# Patient Record
Sex: MALE | Race: WHITE | NOT HISPANIC OR LATINO | Employment: FULL TIME | URBAN - METROPOLITAN AREA
[De-identification: names, ages, dates, MRNs, and addresses within clinical notes are randomized per-mention and may not be internally consistent; named-entity substitution may affect disease eponyms.]

---

## 2023-03-30 DIAGNOSIS — L30.9 DERMATITIS: ICD-10-CM

## 2023-03-30 DIAGNOSIS — L30.9 DERMATITIS: Primary | ICD-10-CM

## 2023-03-30 RX ORDER — KETOCONAZOLE 20 MG/G
CREAM TOPICAL
Qty: 30 G | Refills: 1 | Status: SHIPPED | OUTPATIENT
Start: 2023-03-30 | End: 2023-03-31

## 2023-03-31 RX ORDER — KETOCONAZOLE 20 MG/G
CREAM TOPICAL
Qty: 30 G | Refills: 1 | Status: SHIPPED | OUTPATIENT
Start: 2023-03-31 | End: 2023-03-31

## 2023-06-05 LAB
ALANINE AMINOTRANSFERASE (SGPT) (U/L) IN SER/PLAS: 23 U/L (ref 10–52)
ALBUMIN (G/DL) IN SER/PLAS: 4 G/DL (ref 3.4–5)
ALKALINE PHOSPHATASE (U/L) IN SER/PLAS: 71 U/L (ref 33–120)
ANION GAP IN SER/PLAS: 9 MMOL/L (ref 10–20)
ASPARTATE AMINOTRANSFERASE (SGOT) (U/L) IN SER/PLAS: 20 U/L (ref 9–39)
BILIRUBIN TOTAL (MG/DL) IN SER/PLAS: 0.6 MG/DL (ref 0–1.2)
CALCIUM (MG/DL) IN SER/PLAS: 8.8 MG/DL (ref 8.6–10.3)
CARBON DIOXIDE, TOTAL (MMOL/L) IN SER/PLAS: 28 MMOL/L (ref 21–32)
CHLORIDE (MMOL/L) IN SER/PLAS: 103 MMOL/L (ref 98–107)
CHOLESTEROL (MG/DL) IN SER/PLAS: 123 MG/DL (ref 0–199)
CHOLESTEROL IN HDL (MG/DL) IN SER/PLAS: 45.3 MG/DL
CHOLESTEROL/HDL RATIO: 2.7
CREATININE (MG/DL) IN SER/PLAS: 0.88 MG/DL (ref 0.5–1.3)
ERYTHROCYTE DISTRIBUTION WIDTH (RATIO) BY AUTOMATED COUNT: 12.1 % (ref 11.5–14.5)
ERYTHROCYTE MEAN CORPUSCULAR HEMOGLOBIN CONCENTRATION (G/DL) BY AUTOMATED: 33 G/DL (ref 32–36)
ERYTHROCYTE MEAN CORPUSCULAR VOLUME (FL) BY AUTOMATED COUNT: 93 FL (ref 80–100)
ERYTHROCYTES (10*6/UL) IN BLOOD BY AUTOMATED COUNT: 4.52 X10E12/L (ref 4.5–5.9)
GFR MALE: >90 ML/MIN/1.73M2
GLUCOSE (MG/DL) IN SER/PLAS: 170 MG/DL (ref 74–99)
HEMATOCRIT (%) IN BLOOD BY AUTOMATED COUNT: 42.1 % (ref 41–52)
HEMOGLOBIN (G/DL) IN BLOOD: 13.9 G/DL (ref 13.5–17.5)
LDL: 63 MG/DL (ref 0–99)
LEUKOCYTES (10*3/UL) IN BLOOD BY AUTOMATED COUNT: 6 X10E9/L (ref 4.4–11.3)
PLATELETS (10*3/UL) IN BLOOD AUTOMATED COUNT: 223 X10E9/L (ref 150–450)
POTASSIUM (MMOL/L) IN SER/PLAS: 3.9 MMOL/L (ref 3.5–5.3)
PROTEIN TOTAL: 6 G/DL (ref 6.4–8.2)
SODIUM (MMOL/L) IN SER/PLAS: 136 MMOL/L (ref 136–145)
THYROTROPIN (MIU/L) IN SER/PLAS BY DETECTION LIMIT <= 0.05 MIU/L: 1.35 MIU/L (ref 0.44–3.98)
TRIGLYCERIDE (MG/DL) IN SER/PLAS: 76 MG/DL (ref 0–149)
UREA NITROGEN (MG/DL) IN SER/PLAS: 11 MG/DL (ref 6–23)
VLDL: 15 MG/DL (ref 0–40)

## 2023-06-06 LAB
ALBUMIN (MG/L) IN URINE: <7 MG/L
ALBUMIN/CREATININE (UG/MG) IN URINE: NORMAL UG/MG CRT (ref 0–30)
CREATININE (MG/DL) IN URINE: 43.2 MG/DL (ref 20–370)
ESTIMATED AVERAGE GLUCOSE FOR HBA1C: 126 MG/DL
HEMOGLOBIN A1C/HEMOGLOBIN TOTAL IN BLOOD: 6 %

## 2023-06-22 ENCOUNTER — OFFICE VISIT (OUTPATIENT)
Dept: PRIMARY CARE | Facility: CLINIC | Age: 58
End: 2023-06-22
Payer: COMMERCIAL

## 2023-06-22 VITALS
DIASTOLIC BLOOD PRESSURE: 70 MMHG | SYSTOLIC BLOOD PRESSURE: 120 MMHG | WEIGHT: 280 LBS | HEART RATE: 54 BPM | BODY MASS INDEX: 34.08 KG/M2

## 2023-06-22 DIAGNOSIS — Z12.11 SCREENING FOR MALIGNANT NEOPLASM OF COLON: ICD-10-CM

## 2023-06-22 DIAGNOSIS — E10.9 TYPE 1 DIABETES MELLITUS WITHOUT COMPLICATION (MULTI): ICD-10-CM

## 2023-06-22 DIAGNOSIS — Z96.41 INSULIN PUMP STATUS: ICD-10-CM

## 2023-06-22 DIAGNOSIS — E66.1 CLASS 1 DRUG-INDUCED OBESITY WITH SERIOUS COMORBIDITY AND BODY MASS INDEX (BMI) OF 34.0 TO 34.9 IN ADULT: ICD-10-CM

## 2023-06-22 DIAGNOSIS — Z00.00 ANNUAL PHYSICAL EXAM: Primary | ICD-10-CM

## 2023-06-22 DIAGNOSIS — E78.2 MIXED HYPERLIPIDEMIA: ICD-10-CM

## 2023-06-22 PROBLEM — L20.9 ATOPIC DERMATITIS: Status: ACTIVE | Noted: 2023-06-22

## 2023-06-22 PROBLEM — D36.9 TUBULAR ADENOMA: Status: ACTIVE | Noted: 2023-06-22

## 2023-06-22 PROBLEM — E78.5 HYPERLIPIDEMIA: Status: ACTIVE | Noted: 2023-06-22

## 2023-06-22 PROBLEM — B37.2 INTERTRIGINOUS CANDIDIASIS: Status: ACTIVE | Noted: 2023-06-22

## 2023-06-22 PROBLEM — I25.10 ATHEROSCLEROSIS OF CORONARY ARTERY: Status: ACTIVE | Noted: 2023-06-22

## 2023-06-22 PROBLEM — K63.5 BENIGN COLON POLYP: Status: ACTIVE | Noted: 2023-06-22

## 2023-06-22 PROBLEM — N52.9 ERECTILE DYSFUNCTION: Status: ACTIVE | Noted: 2023-06-22

## 2023-06-22 PROCEDURE — 3008F BODY MASS INDEX DOCD: CPT | Performed by: NURSE PRACTITIONER

## 2023-06-22 PROCEDURE — 3074F SYST BP LT 130 MM HG: CPT | Performed by: NURSE PRACTITIONER

## 2023-06-22 PROCEDURE — 99396 PREV VISIT EST AGE 40-64: CPT | Performed by: NURSE PRACTITIONER

## 2023-06-22 PROCEDURE — 1036F TOBACCO NON-USER: CPT | Performed by: NURSE PRACTITIONER

## 2023-06-22 PROCEDURE — 3044F HG A1C LEVEL LT 7.0%: CPT | Performed by: NURSE PRACTITIONER

## 2023-06-22 PROCEDURE — 3078F DIAST BP <80 MM HG: CPT | Performed by: NURSE PRACTITIONER

## 2023-06-22 PROCEDURE — 99213 OFFICE O/P EST LOW 20 MIN: CPT | Performed by: NURSE PRACTITIONER

## 2023-06-22 RX ORDER — INSULIN ASPART 100 [IU]/ML
INJECTION, SOLUTION INTRAVENOUS; SUBCUTANEOUS
COMMUNITY
End: 2023-12-18 | Stop reason: SDUPTHER

## 2023-06-22 RX ORDER — ASPIRIN 325 MG
TABLET, DELAYED RELEASE (ENTERIC COATED) ORAL
COMMUNITY
End: 2023-12-18 | Stop reason: WASHOUT

## 2023-06-22 RX ORDER — ATORVASTATIN CALCIUM 20 MG/1
1 TABLET, FILM COATED ORAL DAILY
COMMUNITY
Start: 2022-11-08 | End: 2023-12-18 | Stop reason: WASHOUT

## 2023-06-22 RX ORDER — MULTIVITAMIN
1 TABLET ORAL DAILY
COMMUNITY

## 2023-06-22 RX ORDER — LANCETS
EACH MISCELLANEOUS
COMMUNITY
Start: 2022-07-18 | End: 2024-04-09 | Stop reason: ENTERED-IN-ERROR

## 2023-06-22 RX ORDER — SILDENAFIL 100 MG/1
1 TABLET, FILM COATED ORAL DAILY PRN
COMMUNITY
Start: 2021-02-18 | End: 2023-09-22 | Stop reason: SDUPTHER

## 2023-06-22 RX ORDER — BLOOD-GLUCOSE TRANSMITTER
EACH MISCELLANEOUS
COMMUNITY
Start: 2023-04-06

## 2023-06-22 RX ORDER — ASCORBIC ACID 250 MG
250 TABLET ORAL DAILY
COMMUNITY

## 2023-06-22 NOTE — PROGRESS NOTES
Subjective   Reason for Visit: Emiliano Walker is an 57 y.o. male here for a CPE     Chief Complaint   Patient presents with    Annual Exam     Lobito is here today for yearly CPE.        HPI  58 yo male presents for CPE    Pt had FBW done earlier this month    Pt will be seeing podiatry next month, Dr. Flores      PMH: IDDM, ED, OBESITY      #IDDM  Tx per Dr. Bates  Last A1C= 6%  June 2023  Insulin Pump since 2006  not exercising regularly   Statin: Atorvastatin   ACE/ARB: none   Last eye exam:  Dec 2022     #ED  Rx Viagra as needed   no use of nitrates    #OBESITY, BMI 34  2/2 Insulin Pump  Started exercising regularly last week w/wife      #HM      COLON: DUE 2023  PSA: UTD 2021       Orders Only on 06/05/2023   Component Date Value Ref Range Status    WBC 06/05/2023 6.0  4.4 - 11.3 x10E9/L Final    RBC 06/05/2023 4.52  4.50 - 5.90 x10E12/L Final    Hemoglobin 06/05/2023 13.9  13.5 - 17.5 g/dL Final    Hematocrit 06/05/2023 42.1  41.0 - 52.0 % Final    MCV 06/05/2023 93  80 - 100 fL Final    MCHC 06/05/2023 33.0  32.0 - 36.0 g/dL Final    Platelets 06/05/2023 223  150 - 450 x10E9/L Final    RDW 06/05/2023 12.1  11.5 - 14.5 % Final    Glucose 06/05/2023 170 (H)  74 - 99 mg/dL Final    Sodium 06/05/2023 136  136 - 145 mmol/L Final    Potassium 06/05/2023 3.9  3.5 - 5.3 mmol/L Final    Chloride 06/05/2023 103  98 - 107 mmol/L Final    Bicarbonate 06/05/2023 28  21 - 32 mmol/L Final    Anion Gap 06/05/2023 9 (L)  10 - 20 mmol/L Final    Urea Nitrogen 06/05/2023 11  6 - 23 mg/dL Final    Creatinine 06/05/2023 0.88  0.50 - 1.30 mg/dL Final    GFR MALE 06/05/2023 >90  >90 mL/min/1.73m2 Final    Comment:  CALCULATIONS OF ESTIMATED GFR ARE PERFORMED   USING THE 2021 CKD-EPI STUDY REFIT EQUATION   WITHOUT THE RACE VARIABLE FOR THE IDMS-TRACEABLE   CREATININE METHODS.    https://jasn.asnjournals.org/content/early/2021/09/22/ASN.3783589596      Calcium 06/05/2023 8.8  8.6 - 10.3 mg/dL Final    Albumin 06/05/2023 4.0   3.4 - 5.0 g/dL Final    Alkaline Phosphatase 06/05/2023 71  33 - 120 U/L Final    Total Protein 06/05/2023 6.0 (L)  6.4 - 8.2 g/dL Final    AST 06/05/2023 20  9 - 39 U/L Final    Total Bilirubin 06/05/2023 0.6  0.0 - 1.2 mg/dL Final    ALT (SGPT) 06/05/2023 23  10 - 52 U/L Final    Comment:  Patients treated with Sulfasalazine may generate    falsely decreased results for ALT.      Hemoglobin A1C 06/05/2023 6.0 (A)  % Final    Comment:      Diagnosis of Diabetes-Adults   Non-Diabetic: < or = 5.6%   Increased risk for developing diabetes: 5.7-6.4%   Diagnostic of diabetes: > or = 6.5%  .       Monitoring of Diabetes                Age (y)     Therapeutic Goal (%)   Adults:          >18           <7.0   Pediatrics:    13-18           <7.5                   7-12           <8.0                   0- 6            7.5-8.5   American Diabetes Association. Diabetes Care 33(S1), Jan 2010.      Estimated Average Glucose 06/05/2023 126  MG/DL Final    Cholesterol 06/05/2023 123  0 - 199 mg/dL Final    Comment: .      AGE      DESIRABLE   BORDERLINE HIGH   HIGH     0-19 Y     0 - 169       170 - 199     >/= 200    20-24 Y     0 - 189       190 - 224     >/= 225         >24 Y     0 - 199       200 - 239     >/= 240   **All ranges are based on fasting samples. Specific   therapeutic targets will vary based on patient-specific   cardiac risk.  .   Pediatric guidelines reference:Pediatrics 2011, 128(S5).   Adult guidelines reference: NCEP ATPIII Guidelines,     NATASHA 2001, 258:2486-97  .   Venipuncture immediately after or during the    administration of Metamizole may lead to falsely   low results. Testing should be performed immediately   prior to Metamizole dosing.      HDL 06/05/2023 45.3  mg/dL Final    Comment: .      AGE      VERY LOW   LOW     NORMAL    HIGH       0-19 Y       < 35   < 40     40-45     ----    20-24 Y       ----   < 40       >45     ----      >24 Y       ----   < 40     40-60      >60  .      Cholesterol/HDL  Ratio 06/05/2023 2.7   Final    Comment: REF VALUES  DESIRABLE  < 3.4  HIGH RISK  > 5.0      LDL 06/05/2023 63  0 - 99 mg/dL Final    Comment: .                           NEAR      BORD      AGE      DESIRABLE  OPTIMAL    HIGH     HIGH     VERY HIGH     0-19 Y     0 - 109     ---    110-129   >/= 130     ----    20-24 Y     0 - 119     ---    120-159   >/= 160     ----      >24 Y     0 -  99   100-129  130-159   160-189     >/=190  .      VLDL 06/05/2023 15  0 - 40 mg/dL Final    Triglycerides 06/05/2023 76  0 - 149 mg/dL Final    Comment: .      AGE      DESIRABLE   BORDERLINE HIGH   HIGH     VERY HIGH   0 D-90 D    19 - 174         ----         ----        ----  91 D- 9 Y     0 -  74        75 -  99     >/= 100      ----    10-19 Y     0 -  89        90 - 129     >/= 130      ----    20-24 Y     0 - 114       115 - 149     >/= 150      ----         >24 Y     0 - 149       150 - 199    200- 499    >/= 500  .   Venipuncture immediately after or during the    administration of Metamizole may lead to falsely   low results. Testing should be performed immediately   prior to Metamizole dosing.      ALBUMIN (MG/L) IN URINE 06/05/2023 <7.0  Not Established mg/L Final    Albumin/Creatine Ratio 06/05/2023 SEE COMMENT  0.0 - 30.0 ug/mg crt Final    Comment: One or more analytes used in this calculation   is outside of the analytical measurement range.  Calculation cannot be performed.      Creatinine, Urine 06/05/2023 43.2  20.0 - 370.0 mg/dL Final    TSH 06/05/2023 1.35  0.44 - 3.98 mIU/L Final    Comment:  TSH testing is performed using different testing    methodology at [facility] than at other    [facility]. Direct result comparisons should    only be made within the same method.           Patient Care Team:  VIRY Barksdale as PCP - General (Family Medicine)  VIRY Barksdale as PCP - Employee [employer] PCP     Review of Systems  All 13 systems were reviewed and are within normal limits  except positive and pertinent negative responses which are noted below or in HPI.      Objective   Vitals:  /70   Pulse 54   Wt 127 kg (280 lb)   BMI 34.08 kg/m²       Physical Exam    Assessment/Plan

## 2023-06-22 NOTE — PATIENT INSTRUCTIONS
Thank you for seeing me today.  It was a pleasure to see you again!    Today we did your Annual Physical Exam and discussed the following:     Continue all medications     Schedule Colonoscopy    See Podiatry as scheduled    RTC 1 YEAR AND AS NEEDED

## 2023-09-22 DIAGNOSIS — N52.9 ERECTILE DYSFUNCTION, UNSPECIFIED ERECTILE DYSFUNCTION TYPE: Primary | ICD-10-CM

## 2023-09-22 RX ORDER — SILDENAFIL 100 MG/1
100 TABLET, FILM COATED ORAL DAILY PRN
Qty: 10 TABLET | Refills: 3 | Status: SHIPPED | OUTPATIENT
Start: 2023-09-22 | End: 2023-09-22

## 2023-10-30 ENCOUNTER — PHARMACY VISIT (OUTPATIENT)
Dept: PHARMACY | Facility: CLINIC | Age: 58
End: 2023-10-30
Payer: COMMERCIAL

## 2023-10-30 DIAGNOSIS — E10.9 TYPE 1 DIABETES MELLITUS WITHOUT COMPLICATION (MULTI): Primary | ICD-10-CM

## 2023-10-30 PROCEDURE — RXMED WILLOW AMBULATORY MEDICATION CHARGE

## 2023-11-02 RX ORDER — INSULIN ASPART 100 [IU]/ML
INJECTION, SOLUTION INTRAVENOUS; SUBCUTANEOUS
Qty: 110 ML | Refills: 3 | Status: SHIPPED | OUTPATIENT
Start: 2023-11-02 | End: 2023-12-18 | Stop reason: CLARIF

## 2023-11-03 ENCOUNTER — PHARMACY VISIT (OUTPATIENT)
Dept: PHARMACY | Facility: CLINIC | Age: 58
End: 2023-11-03
Payer: COMMERCIAL

## 2023-11-03 PROCEDURE — RXMED WILLOW AMBULATORY MEDICATION CHARGE

## 2023-12-18 ENCOUNTER — OFFICE VISIT (OUTPATIENT)
Dept: ENDOCRINOLOGY | Facility: CLINIC | Age: 58
End: 2023-12-18
Payer: COMMERCIAL

## 2023-12-18 VITALS
SYSTOLIC BLOOD PRESSURE: 133 MMHG | HEART RATE: 60 BPM | BODY MASS INDEX: 34.57 KG/M2 | DIASTOLIC BLOOD PRESSURE: 79 MMHG | WEIGHT: 284 LBS

## 2023-12-18 DIAGNOSIS — E10.9 TYPE 1 DIABETES MELLITUS WITHOUT COMPLICATION (MULTI): Primary | ICD-10-CM

## 2023-12-18 DIAGNOSIS — Z96.41 INSULIN PUMP STATUS: ICD-10-CM

## 2023-12-18 LAB — POC HEMOGLOBIN A1C: 6.8 % (ref 4.2–6.5)

## 2023-12-18 PROCEDURE — 1036F TOBACCO NON-USER: CPT | Performed by: INTERNAL MEDICINE

## 2023-12-18 PROCEDURE — 99214 OFFICE O/P EST MOD 30 MIN: CPT | Performed by: INTERNAL MEDICINE

## 2023-12-18 PROCEDURE — 3078F DIAST BP <80 MM HG: CPT | Performed by: INTERNAL MEDICINE

## 2023-12-18 PROCEDURE — 3075F SYST BP GE 130 - 139MM HG: CPT | Performed by: INTERNAL MEDICINE

## 2023-12-18 PROCEDURE — 83036 HEMOGLOBIN GLYCOSYLATED A1C: CPT | Performed by: INTERNAL MEDICINE

## 2023-12-18 PROCEDURE — 3008F BODY MASS INDEX DOCD: CPT | Performed by: INTERNAL MEDICINE

## 2023-12-18 PROCEDURE — 3044F HG A1C LEVEL LT 7.0%: CPT | Performed by: INTERNAL MEDICINE

## 2023-12-18 RX ORDER — INSULIN ASPART 100 [IU]/ML
INJECTION, SOLUTION INTRAVENOUS; SUBCUTANEOUS
Qty: 120 ML | Refills: 3 | Status: SHIPPED | OUTPATIENT
Start: 2023-12-18 | End: 2024-01-26 | Stop reason: CLARIF

## 2023-12-18 ASSESSMENT — ENCOUNTER SYMPTOMS
UNEXPECTED WEIGHT CHANGE: 0
NAUSEA: 0
COUGH: 0
VOMITING: 0
DIARRHEA: 0
ENDOCRINE COMMENTS: AS ABOVE

## 2023-12-18 NOTE — PATIENT INSTRUCTIONS
A1c 6.8%    RECOMMENDATIONS  Continue current program  Change from Novolog to Humalog per formulary    Follow up 6 months  Labs and urine albumin before next appointment

## 2023-12-18 NOTE — PROGRESS NOTES
"History Of Present Illness  Emiliano Walker \"Bryn" is a 58 y.o. 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), Benign neoplasm, unspecified site (10/10/2017), Disorder of the skin and subcutaneous tissue, unspecified (10/05/2015), Hyperlipidemia, unspecified (06/21/2022), Other conditions influencing health status (10/10/2017), Other hypertrophic disorders of the skin (10/05/2015), Otitis media, unspecified, right ear (03/12/2021), Pain in left foot (10/29/2018), Pain in left leg (07/13/2019), Pain in unspecified wrist (12/26/2020), Personal history of diseases of the skin and subcutaneous tissue (08/26/2013), Personal history of other diseases of the circulatory system (12/29/2015), Personal history of other diseases of the respiratory system (07/13/2019), Personal history of other specified conditions (10/10/2017), Polyp of colon (10/10/2017), Superficial mycosis, unspecified (06/03/2020), Type 1 diabetes mellitus without complications (CMS/HCC) (12/19/2022), Unspecified acute noninfective otitis externa, right ear (04/02/2021), Unspecified injury of right wrist, hand and finger(s), initial encounter (12/28/2020), Unspecified injury of unspecified wrist, hand and finger(s), initial encounter, and Unspecified injury of unspecified wrist, hand and finger(s), initial encounter.    Surgical History  He has a past surgical history that includes " Urology Clinic Note - New Patient    Referring Provider: Vera Guajardo, [address] [facility] [address]  [address] [place] [address]     Primary Care Provider: Vera Guajardo DO     Chief Complaint:   LUTS, elevates PSA     HPI:     Ric Swanson is a 67year old male with history of HTN, HLD,  AAA, obesity, GERD, Afib on coumadin referred for LUTs, elevated PSA. Has history of colonic diverticular abscess; he presented to hospital with acute diverticulitis with abscess; he was taken urgently to the operating room 1/17/219 for ex lap with drainage of abscess and sigmoid resection +colostomy. At that time urology was asked to see patient due to bladder wall thickening on CT scan and possible involvement with the bladder- they did take bladder biopsies intra-op from exterior bladder where the abscess was to evlauate this further; (biopsy with just inflammation and fibrosis) cystoscopy was done intra-op with Dr. David Rivero which showed edematous changes at bladder dome and posterior wall but no primarily malignancy. Was thought to be all related to his abscess process. Had urinary retention after this with hematuria. Had outpatient cystoscopy with Dr. David Rivero 2/23/2019- cystitis was noted but no other abnormalities. Also with enlarged prostate. He therefore underwent a PVP in [id number] (total joules 96217). Follow up PVR was 0 . At that time he did have a PSA of >4 but but wanted observation. He has not since followed with urology. Of note, last imaging about 18mo ago with very distended bladder but no hydronephrosis; small diverticulum noted in bladder. Prostate mildly enlarged. Patient has no recollection of prostate PVP or retention. He has been noted to have elevated PSA in the past but has been taking OTC supplements to try to bring this down. He remains on Tamsulosin. Patient also with recently worsening LUTS; sepcifically incomplete emptying and frequency.  PVR today is >1L and patient unable to empty Other surgical history (08/26/2013); Colonoscopy (01/15/2019); and Other surgical history (06/21/2022).     Social History  He reports that he has never smoked. He has never been exposed to tobacco smoke. He has never used smokeless tobacco. He reports that he does not currently use alcohol. He reports that he does not use drugs.    Family History  Family History   Problem Relation Name Age of Onset    Other (bypass) Father      Colon cancer Maternal Grandmother         Medications  Current Outpatient Medications   Medication Instructions    Accu-Chek Fastclix Lancet Drum misc     ascorbic acid (Vitamin C) 250 mg tablet oral    atorvastatin (Lipitor) 20 mg tablet TAKE 1 TABLET BY MOUTH ONE TIME DAILY    Dexcom G6 Transmitter device     insulin aspart (NovoLOG U-100 Insulin aspart) 100 unit/mL injection USE AS DIRECTED WITH INSULIN PUMP UP  UNITS PER DAY    ketoconazole (NIZOral) 2 % cream APPLY A THIN LAYER TO AFFECTED AREA(S) OF SKIN TWICE A DAY    multivitamin (Daily Multi-Vitamin) tablet oral    NovoLOG U-100 Insulin aspart 100 unit/mL injection subcutaneous    sildenafil (Viagra) 100 mg tablet TAKE 1 TABLET BY MOUTH ONE TIME DAILY AS NEEDED       Allergies  Patient has no known allergies.    Review of Systems   Constitutional:  Negative for unexpected weight change.   Eyes:  Negative for visual disturbance.   Respiratory:  Negative for cough.    Gastrointestinal:  Negative for diarrhea, nausea and vomiting.   Endocrine:        As above         Last Recorded Vitals  Blood pressure 133/79, pulse 60, weight 129 kg (284 lb).    Physical Exam  Constitutional:       General: He is not in acute distress.  HENT:      Head: Normocephalic.   Eyes:      Extraocular Movements: Extraocular movements intact.   Neck:      Thyroid: No thyromegaly.   Cardiovascular:      Pulses:           Radial pulses are 2+ on the right side and 2+ on the left side.   Musculoskeletal:      Right lower leg: No edema.      Left lower leg:  more. AUA SS is 13. He is asymptomatic from this. No abdominal or flank pain. No UTI. Cr has been stable 1.1-1.2 over past year. No gross hematuria. Post-void residual bladder scan: 1200 mL  Urinalysis (clinic dip): Trace L     PSA:    Lab Results   Component Value Date    PSAS 5.43 (H) 09/09/2022    PSAS 4.59 (H) 09/09/2021    PSAS 4.32 (H) 08/07/2018    PSAS 3.99 06/30/2017      Last Cr was 1.24 done on 8/17/2023. History:     Past Medical History:   Diagnosis Date    Abdominal hernia     Actinic keratosis 8/17/2018    Acute diverticulitis 1/17/2019    Acute pain of right knee 6/14/2018    Aortic root dilation ([address] 3/17/2019    Arrhythmia     Arthritis     Ascending aorta dilation ([address] 3/17/2019    Bicuspid aortic valve 3/17/2019    Class 1 obesity due to excess calories with serious comorbidity and body mass index (BMI) of 32.0 to 32.9 in adult 3/17/2019    Associated with hypertension    Class 1 obesity due to excess calories with serious comorbidity and body mass index (BMI) of 34.0 to 34.9 in adult 8/1/2019    Associated with Hypertension and Hyperlipidemia    Colonic diverticular abscess     Elevated left ventricular end-diastolic pressure (LVEDP) 3/17/2019    Esophageal reflux     Gastroesophageal reflux disease 6/30/2017    Heart palpitations     Herpes zoster without complication 18/3/0738    11/29/2017    High blood pressure     History of cardiac murmur     History of rheumatic fever     Mild aortic valve regurgitation 3/17/2019    Mixed hyperlipidemia 6/30/2017    Perforation of sigmoid colon due to diverticulitis 2/7/2019    Postherpetic neuralgia 12/12/2017    Right trigeminal neuralgia 12/12/2017    Seborrheic keratosis 8/17/2018       Past Surgical History:   Procedure Laterality Date    COLECTOMY  01/17/2019    Sigmoid colon resection with colostomy creation    COLONOSCOPY  06/21/2019    Dr Susy Zepeda    EGD  02/22/2022    With biopsy.   Dr. Jose Tierney      x2 right No edema.   Lymphadenopathy:      Cervical: No cervical adenopathy.   Neurological:      Mental Status: He is alert.      Motor: No tremor.   Psychiatric:         Mood and Affect: Affect normal.          Relevant Results  Glucose (mg/dL)   Date Value   06/05/2023 170 (H)   08/18/2022 156 (H)   02/05/2022 93     Hemoglobin A1C (%)   Date Value   06/05/2023 6.0 (A)   08/18/2022 6.4 (A)   02/05/2022 6.5 (A)     Bicarbonate (mmol/L)   Date Value   06/05/2023 28   08/18/2022 25   02/05/2022 28     Urea Nitrogen (mg/dL)   Date Value   06/05/2023 11   08/18/2022 10   02/05/2022 8     Creatinine (mg/dL)   Date Value   06/05/2023 0.88   08/18/2022 0.78   02/05/2022 0.77     Lab Results   Component Value Date    CHOL 123 06/05/2023    CHOL 127 08/18/2022    CHOL 124 02/05/2022     Lab Results   Component Value Date    HDL 45.3 06/05/2023    HDL 45.6 08/18/2022    HDL 54.0 02/05/2022     Lab Results   Component Value Date    TRIG 76 06/05/2023    TRIG 60 08/18/2022    TRIG 40 02/05/2022     Lab Results   Component Value Date    TSH 1.35 06/05/2023       IMPRESSION  TYPE 1 DIABETES MELLITUS  INSULIN PUMP STATUS  Rapid A1c 6.8%  Excellent overall glucose control      RECOMMENDATIONS  Continue current program  Change from Novolog to Humalog per formulary    Follow up 6 months  Labs and urine albumin before next appointment     side; many years ago    Alfredo 2484 lens removal and replaced @ [facility]     OTHER SURGICAL HISTORY  1/1719    Exploratory laparotomy. OTHER SURGICAL HISTORY  01/17/2019    Drainage of intra-abdominal abscess       Family History   Problem Relation Age of Onset    Melanoma Father     Heart Disease Father     Stroke Mother        Social History     Socioeconomic History    Marital status:    Tobacco Use    Smoking status: Never     Passive exposure: Past    Smokeless tobacco: Never   Vaping Use    Vaping Use: Never used   Substance and Sexual Activity    Alcohol use: Yes     Alcohol/week: 1.0 standard drink of alcohol     Types: 1 Glasses of wine per week     Comment: occ    Drug use: No   Other Topics Concern    Caffeine Concern Yes     Comment: 1 bottle of diet soda weekly    Exercise Yes     Comment: Walking the dogs daily    Seat Belt Yes       Medications (Active prior to today's visit):  Current Outpatient Medications   Medication Sig Dispense Refill    lisinopril-hydroCHLOROthiazide 10-12.5 MG Oral Tab Take 1 tablet by mouth daily. 90 tablet 0    pantoprazole 20 MG Oral Tab EC Take 1 tablet (20 mg total) by mouth every morning before breakfast. 90 tablet 0    ATORVASTATIN 40 MG Oral Tab TAKE 1 TABLET(40 MG) BY MOUTH EVERY NIGHT 90 tablet 3    Multiple Vitamins-Minerals (HAIR/SKIN/NAILS) Oral Tab Multiple Vitamins-Minerals (HAIR/SKIN/NAILS) Oral Tab, [RxNorm: 0]      metoprolol succinate ER 25 MG Oral Tablet 24 Hr Take 0.5 tablets (12.5 mg total) by mouth 2 (two) times daily. Prescribed by Dr. Steve Turk 1 tablet 0    aspirin 81 MG Oral Chew Tab Chew 1 tablet (81 mg total) by mouth daily. Cholecalciferol (VITAMIN D) 50 MCG (2000 UT) Oral Tab Take 2,000 Units by mouth daily with dinner. Thierno, Zingiber officinalis, (THIERNO OR) Take by mouth daily. TURMERIC OR Take by mouth daily. warfarin 2.5 MG Oral Tab Take 1 tablet (2.5 mg total) by mouth daily. Flecainide 100 MG Oral Tab Take 1 tablet (100 mg total) by mouth every 12 (twelve) hours. 60 tablet 3    acetaminophen 325 MG Oral Tab Take 1 tablet (325 mg total) by mouth every 6 (six) hours as needed for Pain. AQUAPHOR External Ointment Apply topically as needed for Dry Skin. Misc Natural Products (PROSTATE SUPPORT OR) Take 1 tablet by mouth daily. Allergies:    Amlodipine              OTHER (SEE COMMENTS)    Comment:Chest Pain      Review of Systems:   A comprehensive 10-point review of systems was completed. Pertinent positives and negatives are noted in the the HPI. Physical Exam:   CONSTITUTIONAL: Well developed, well nourished, in no acute distress  NEUROLOGIC: Alert and oriented  HEAD: Normocephalic, atraumatic  ENT: Hearing intact   RESPIRATORY: Normal respiratory effort  SKIN: No evident rashes  ABDOMEN: Soft, non-tender, non-distended, colostomy in place with stool   SABINA deferred per patient     No results found. Assessment & Plan:       Placido Dubon is a 67year old male with history of HTN, HLD,  AAA, obesity, GERD, Afib on coumadin referred for LUTs, elevated PSA. # Urinary retention  - Patient with complicated history as noted above; had an ex lap for sigmoid abscess that involved wall of bladder in 2019 with postop retention and subsequent PVP with Dr. Cas Sam. Apparently was voiding well after this but CT in 2021 with massively dilated bladder. Prostate at that time only mildly enlarged. PVR today >1000cc. His renal function appears to be stable; he has no history of UTI or upper tract injury on imaging. Has no abdominal or flank pain. I still recommended a ramos catheter or CIC for decompression; he declined and understands risks of kidney failure, infection, death. He would like to proceed with further workup. Will plan for BMP, CT urogram and cystoscopy to evaluate bladder and prostate further and assess upper tracts. If no obvious abnormalities may require UDS. Continue flomax. Check Ucx. # Elevated PSA;     I had a long discussion with the patient regarding his elevated PSA level. I explained that PSA (prostate-specific antigen) is a protein that is secreted by prostate cells into the blood stream.  I reviewed the various causes of PSA elevation, both benign and malignant, including prostate cancer, benign prostatic hyperplasia, prostatitis, recent urinary tract instrumentation, urinary infections, and urinary retention. In addition, a number of factors can alter the PSA level over time, including age, medications (such as finasteride), diet, herbal supplements, and of course surgical procedures on the prostate itself. With an elevated PSA level, it is important to rule out prostate cancer as a potential cause. The only way to reliably do this is with a biopsy of the prostate. I explained that this is done under ultrasound guidance using a rectal probe. This allows excellent visualization of the prostate, measurements of the size of the gland, as well as accurate placement of the biospy needle. I emphasized that, although prostate biopsies are good at detecting prostate cancer, it is not 100%. It is subject to sampling error, and there is a possibility of actually missing the area of the prostate with the cancer. I assured the patient that we try to sample the areas that are most likely going to be involved with the cancer (usually at the periphery of the gland). In cases of a repeat biopsy, we will also sample other areas, such as the transition zone or central areas of the prostate for a more complete assessment. We discussed potential for 4k score testing to further risk stratify and he would like to proceed. He is high risk for biopsy given his Coumadin use. He would lke to avoid biopsy unless found to be high risk. Return in 3 weeks for cysto and to discuss 4k score           Thank you for this consult.     I have personally reviewed all relevant medical records, labs, and imaging.             Daniella Street MD  Staff Urologist  [facility] [address], [facility]  Office: [phone]

## 2023-12-18 NOTE — LETTER
"December 18, 2023     Marina Martinez, APRN-CNP  [address]  [facility]  [facility] [id number]    Patient: Lobito Walker   YOB: 1965   Date of Visit: 12/18/2023       Dear Dr. Marina Martinez, APRN-CNP:    Thank you for referring Lobito Walker to me for evaluation. Below are my notes for this consultation.  If you have questions, please do not hesitate to call me. I look forward to following your patient along with you.       Sincerely,     Tyrell Bates MD      CC: No Recipients  ______________________________________________________________________________________    History Of Present Illness  Emiliano Walker \"Bryn" is a 58 y.o. 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), Benign neoplasm, unspecified site (10/10/2017), Disorder of the skin and subcutaneous tissue, unspecified (10/05/2015), Hyperlipidemia, unspecified (06/21/2022), Other conditions influencing health status (10/10/2017), Other hypertrophic disorders of the skin (10/05/2015), Otitis media, unspecified, right ear (03/12/2021), Pain in left foot (10/29/2018), Pain in left leg (07/13/2019), Pain in unspecified wrist (12/26/2020), Personal history of diseases of the skin and subcutaneous tissue (08/26/2013), Personal history of other diseases of the circulatory system (12/29/2015), Personal history of other diseases of the " respiratory system (07/13/2019), Personal history of other specified conditions (10/10/2017), Polyp of colon (10/10/2017), Superficial mycosis, unspecified (06/03/2020), Type 1 diabetes mellitus without complications (CMS/[facility]) (12/19/2022), Unspecified acute noninfective otitis externa, right ear (04/02/2021), Unspecified injury of right wrist, hand and finger(s), initial encounter (12/28/2020), Unspecified injury of unspecified wrist, hand and finger(s), initial encounter, and Unspecified injury of unspecified wrist, hand and finger(s), initial encounter.    Surgical History  He has a past surgical history that includes Other surgical history (08/26/2013); Colonoscopy (01/15/2019); and Other surgical history (06/21/2022).     Social History  He reports that he has never smoked. He has never been exposed to tobacco smoke. He has never used smokeless tobacco. He reports that he does not currently use alcohol. He reports that he does not use drugs.    Family History  Family History   Problem Relation Name Age of Onset   • Other (bypass) Father     • Colon cancer Maternal Grandmother         Medications  Current Outpatient Medications   Medication Instructions   • Accu-Chek Fastclix Lancet Drum misc    • ascorbic acid (Vitamin C) 250 mg tablet oral   • atorvastatin (Lipitor) 20 mg tablet TAKE 1 TABLET BY MOUTH ONE TIME DAILY   • Dexcom G6 Transmitter device    • insulin aspart (NovoLOG U-100 Insulin aspart) 100 unit/mL injection USE AS DIRECTED WITH INSULIN PUMP UP  UNITS PER DAY   • ketoconazole (NIZOral) 2 % cream APPLY A THIN LAYER TO AFFECTED AREA(S) OF SKIN TWICE A DAY   • multivitamin (Daily Multi-Vitamin) tablet oral   • NovoLOG U-100 Insulin aspart 100 unit/mL injection subcutaneous   • sildenafil (Viagra) 100 mg tablet TAKE 1 TABLET BY MOUTH ONE TIME DAILY AS NEEDED       Allergies  Patient has no known allergies.    Review of Systems   Constitutional:  Negative for unexpected weight change.   Eyes:   Negative for visual disturbance.   Respiratory:  Negative for cough.    Gastrointestinal:  Negative for diarrhea, nausea and vomiting.   Endocrine:        As above         Last Recorded Vitals  Blood pressure 133/79, pulse 60, weight 129 kg (284 lb).    Physical Exam  Constitutional:       General: He is not in acute distress.  HENT:      Head: Normocephalic.   Eyes:      Extraocular Movements: Extraocular movements intact.   Neck:      Thyroid: No thyromegaly.   Cardiovascular:      Pulses:           Radial pulses are 2+ on the right side and 2+ on the left side.   Musculoskeletal:      Right lower leg: No edema.      Left lower leg: No edema.   Lymphadenopathy:      Cervical: No cervical adenopathy.   Neurological:      Mental Status: He is alert.      Motor: No tremor.   Psychiatric:         Mood and Affect: Affect normal.          Relevant Results  Glucose (mg/dL)   Date Value   06/05/2023 170 (H)   08/18/2022 156 (H)   02/05/2022 93     Hemoglobin A1C (%)   Date Value   06/05/2023 6.0 (A)   08/18/2022 6.4 (A)   02/05/2022 6.5 (A)     Bicarbonate (mmol/L)   Date Value   06/05/2023 28   08/18/2022 25   02/05/2022 28     Urea Nitrogen (mg/dL)   Date Value   06/05/2023 11   08/18/2022 10   02/05/2022 8     Creatinine (mg/dL)   Date Value   06/05/2023 0.88   08/18/2022 0.78   02/05/2022 0.77     Lab Results   Component Value Date    CHOL 123 06/05/2023    CHOL 127 08/18/2022    CHOL 124 02/05/2022     Lab Results   Component Value Date    HDL 45.3 06/05/2023    HDL 45.6 08/18/2022    HDL 54.0 02/05/2022     Lab Results   Component Value Date    TRIG 76 06/05/2023    TRIG 60 08/18/2022    TRIG 40 02/05/2022     Lab Results   Component Value Date    TSH 1.35 06/05/2023       IMPRESSION  TYPE 1 DIABETES MELLITUS  INSULIN PUMP STATUS  Rapid A1c 6.8%  Excellent overall glucose control      RECOMMENDATIONS  Continue current program  Change from Novolog to Humalog per formulary    Follow up 6 months  Labs and urine albumin  before next appointment

## 2024-01-26 DIAGNOSIS — E10.9 TYPE 1 DIABETES MELLITUS WITHOUT COMPLICATION (MULTI): Primary | ICD-10-CM

## 2024-01-26 PROCEDURE — RXMED WILLOW AMBULATORY MEDICATION CHARGE

## 2024-01-26 RX ORDER — INSULIN LISPRO 100 [IU]/ML
INJECTION, SOLUTION INTRAVENOUS; SUBCUTANEOUS
Qty: 120 ML | Refills: 3 | Status: SHIPPED | OUTPATIENT
Start: 2024-01-26

## 2024-01-27 PROCEDURE — RXMED WILLOW AMBULATORY MEDICATION CHARGE

## 2024-01-27 RX ORDER — ATORVASTATIN CALCIUM 20 MG/1
20 TABLET, FILM COATED ORAL DAILY
Qty: 90 TABLET | Refills: 3 | Status: CANCELLED | OUTPATIENT
Start: 2024-01-27 | End: 2025-01-26

## 2024-01-29 ENCOUNTER — PHARMACY VISIT (OUTPATIENT)
Dept: PHARMACY | Facility: CLINIC | Age: 59
End: 2024-01-29
Payer: COMMERCIAL

## 2024-01-29 PROCEDURE — RXMED WILLOW AMBULATORY MEDICATION CHARGE

## 2024-02-07 DIAGNOSIS — E78.2 MIXED HYPERLIPIDEMIA: Primary | ICD-10-CM

## 2024-02-08 ENCOUNTER — PHARMACY VISIT (OUTPATIENT)
Dept: PHARMACY | Facility: CLINIC | Age: 59
End: 2024-02-08
Payer: COMMERCIAL

## 2024-02-08 PROCEDURE — RXMED WILLOW AMBULATORY MEDICATION CHARGE

## 2024-02-08 RX ORDER — ATORVASTATIN CALCIUM 20 MG/1
20 TABLET, FILM COATED ORAL DAILY
Qty: 90 TABLET | Refills: 3 | Status: SHIPPED | OUTPATIENT
Start: 2024-02-08 | End: 2025-02-07

## 2024-02-12 ENCOUNTER — TELEPHONE (OUTPATIENT)
Dept: PHARMACY | Facility: HOSPITAL | Age: 59
End: 2024-02-12
Payer: COMMERCIAL

## 2024-02-12 DIAGNOSIS — E10.9 TYPE 1 DIABETES MELLITUS WITHOUT COMPLICATION (MULTI): Primary | ICD-10-CM

## 2024-02-12 NOTE — TELEPHONE ENCOUNTER
"[facility] ([facility])    Emiliano Walker \"Bryn" is a 58 y.o. male was contacted by Clinical Pharmacy Team to complete a comprehensive medication review (CMR) with a pharmacist as part of the [employer] diabetes program.      No Known Allergies    [facility]  [address]  [place] [address]  Phone: [phone] Fax: [phone]        LAB  Lab Results   Component Value Date    BILITOT 0.6 06/05/2023    CALCIUM 8.8 06/05/2023    CO2 28 06/05/2023     06/05/2023    CREATININE 0.88 06/05/2023    GLUCOSE 170 (H) 06/05/2023    ALKPHOS 71 06/05/2023    K 3.9 06/05/2023    PROT 6.0 (L) 06/05/2023     06/05/2023    AST 20 06/05/2023    ALT 23 06/05/2023    BUN 11 06/05/2023    ANIONGAP 9 (L) 06/05/2023    ALBUMIN 4.0 06/05/2023    GFRMALE >90 06/05/2023     Lab Results   Component Value Date    TRIG 76 06/05/2023    CHOL 123 06/05/2023    HDL 45.3 06/05/2023     Lab Results   Component Value Date    HGBA1C 6.8 (A) 12/18/2023       Current Outpatient Medications on File Prior to Visit   Medication Sig Dispense Refill    Accu-Chek Fastclix Lancet Drum misc       ascorbic acid (Vitamin C) 250 mg tablet Take by mouth.      atorvastatin (Lipitor) 20 mg tablet TAKE 1 TABLET BY MOUTH ONE TIME DAILY 90 tablet 3    Dexcom G6 Transmitter device       insulin lispro (HumaLOG U-100 Insulin) 100 unit/mL injection Continuous subcutaneous insulin pump infusion, 120 units per day. 120 mL 3    ketoconazole (NIZOral) 2 % cream APPLY A THIN LAYER TO AFFECTED AREA(S) OF SKIN TWICE A DAY 30 g 1    multivitamin (Daily Multi-Vitamin) tablet Take by mouth.      sildenafil (Viagra) 100 mg tablet TAKE 1 TABLET BY MOUTH ONE TIME DAILY AS NEEDED 10 tablet 3    [DISCONTINUED] atorvastatin (Lipitor) 20 mg tablet TAKE 1 TABLET BY MOUTH ONE TIME DAILY 90 tablet 3     No current facility-administered medications on file prior to visit.        CURRENT " How Severe Is Your Skin Discoloration?: mild DIABETES PHARMACOTHERAPY  - insulin lispro continuous subcutaneous insulin pump infusion, 120 units per day  - Dexcom G6     SECONDARY PREVENTION  - Statin? yes atorvastatin  - ACE-I/ARB? no    ASSESSMENT/PLAN:  - Patient enrolled in  Employee diabetes program for $0 co-pays on diabetes medications/supplies. Enrollment should be active in 2-4 weeks   - Requested [facility] enrollment date: 2/12/2024  - PharmD Management Level: 1  -  Pharmacy fill location:  [facility]    Problem List Items Addressed This Visit       Type 1 diabetes mellitus (CMS/HCC) - Primary        Follow up: 11 months    Continue all meds under the continuation of care with the referring provider and clinical pharmacy team.    Janes Mayer, PharmD     Patient/Caregiver was informed they may decline to participate or withdraw from participation in pharmacy services at any time.

## 2024-04-05 PROCEDURE — RXMED WILLOW AMBULATORY MEDICATION CHARGE

## 2024-04-09 ENCOUNTER — APPOINTMENT (OUTPATIENT)
Dept: RADIOLOGY | Facility: HOSPITAL | Age: 59
End: 2024-04-09
Payer: COMMERCIAL

## 2024-04-09 ENCOUNTER — HOSPITAL ENCOUNTER (OUTPATIENT)
Facility: HOSPITAL | Age: 59
Setting detail: OBSERVATION
Discharge: HOME | End: 2024-04-10
Attending: GENERAL PRACTICE | Admitting: INTERNAL MEDICINE
Payer: COMMERCIAL

## 2024-04-09 ENCOUNTER — APPOINTMENT (OUTPATIENT)
Dept: CARDIOLOGY | Facility: HOSPITAL | Age: 59
End: 2024-04-09
Payer: COMMERCIAL

## 2024-04-09 DIAGNOSIS — K42.9 UMBILICAL HERNIA WITHOUT OBSTRUCTION AND WITHOUT GANGRENE: ICD-10-CM

## 2024-04-09 DIAGNOSIS — K42.0 UMBILICAL HERNIA, INCARCERATED: Primary | ICD-10-CM

## 2024-04-09 LAB
ABO GROUP (TYPE) IN BLOOD: NORMAL
ALBUMIN SERPL BCP-MCNC: 4.4 G/DL (ref 3.4–5)
ALP SERPL-CCNC: 74 U/L (ref 33–120)
ALT SERPL W P-5'-P-CCNC: 24 U/L (ref 10–52)
ANION GAP BLDV CALCULATED.4IONS-SCNC: 4 MMOL/L (ref 10–25)
ANION GAP SERPL CALC-SCNC: 11 MMOL/L (ref 10–20)
ANTIBODY SCREEN: NORMAL
APPEARANCE UR: CLEAR
AST SERPL W P-5'-P-CCNC: 27 U/L (ref 9–39)
BASE EXCESS BLDV CALC-SCNC: 7.6 MMOL/L (ref -2–3)
BASOPHILS # BLD AUTO: 0.05 X10*3/UL (ref 0–0.1)
BASOPHILS NFR BLD AUTO: 0.8 %
BILIRUB SERPL-MCNC: 0.9 MG/DL (ref 0–1.2)
BILIRUB UR STRIP.AUTO-MCNC: NEGATIVE MG/DL
BODY TEMPERATURE: 37 DEGREES CELSIUS
BUN SERPL-MCNC: 11 MG/DL (ref 6–23)
CA-I BLDV-SCNC: 1.26 MMOL/L (ref 1.1–1.33)
CALCIUM SERPL-MCNC: 9.2 MG/DL (ref 8.6–10.3)
CHLORIDE BLDV-SCNC: 102 MMOL/L (ref 98–107)
CHLORIDE SERPL-SCNC: 103 MMOL/L (ref 98–107)
CO2 SERPL-SCNC: 27 MMOL/L (ref 21–32)
COLOR UR: NORMAL
CREAT SERPL-MCNC: 0.84 MG/DL (ref 0.5–1.3)
EGFRCR SERPLBLD CKD-EPI 2021: >90 ML/MIN/1.73M*2
EOSINOPHIL # BLD AUTO: 0.1 X10*3/UL (ref 0–0.7)
EOSINOPHIL NFR BLD AUTO: 1.7 %
ERYTHROCYTE [DISTWIDTH] IN BLOOD BY AUTOMATED COUNT: 12.4 % (ref 11.5–14.5)
GLUCOSE BLDV-MCNC: 120 MG/DL (ref 74–99)
GLUCOSE SERPL-MCNC: 113 MG/DL (ref 74–99)
GLUCOSE UR STRIP.AUTO-MCNC: NORMAL MG/DL
HCO3 BLDV-SCNC: 33.3 MMOL/L (ref 22–26)
HCT VFR BLD AUTO: 42.7 % (ref 41–52)
HCT VFR BLD EST: 46 % (ref 41–52)
HGB BLD-MCNC: 14.7 G/DL (ref 13.5–17.5)
HGB BLDV-MCNC: 15.4 G/DL (ref 13.5–17.5)
IMM GRANULOCYTES # BLD AUTO: 0.02 X10*3/UL (ref 0–0.7)
IMM GRANULOCYTES NFR BLD AUTO: 0.3 % (ref 0–0.9)
INHALED O2 CONCENTRATION: 21 %
KETONES UR STRIP.AUTO-MCNC: NEGATIVE MG/DL
LACTATE BLDV-SCNC: 1.1 MMOL/L (ref 0.4–2)
LACTATE SERPL-SCNC: 0.8 MMOL/L (ref 0.4–2)
LEUKOCYTE ESTERASE UR QL STRIP.AUTO: NEGATIVE
LIPASE SERPL-CCNC: <3 U/L (ref 9–82)
LYMPHOCYTES # BLD AUTO: 0.96 X10*3/UL (ref 1.2–4.8)
LYMPHOCYTES NFR BLD AUTO: 16.3 %
MCH RBC QN AUTO: 31.1 PG (ref 26–34)
MCHC RBC AUTO-ENTMCNC: 34.4 G/DL (ref 32–36)
MCV RBC AUTO: 90 FL (ref 80–100)
MONOCYTES # BLD AUTO: 0.37 X10*3/UL (ref 0.1–1)
MONOCYTES NFR BLD AUTO: 6.3 %
NEUTROPHILS # BLD AUTO: 4.4 X10*3/UL (ref 1.2–7.7)
NEUTROPHILS NFR BLD AUTO: 74.6 %
NITRITE UR QL STRIP.AUTO: NEGATIVE
NRBC BLD-RTO: 0 /100 WBCS (ref 0–0)
OXYHGB MFR BLDV: 59.1 % (ref 45–75)
PCO2 BLDV: 49 MM HG (ref 41–51)
PH BLDV: 7.44 PH (ref 7.33–7.43)
PH UR STRIP.AUTO: 6.5 [PH]
PLATELET # BLD AUTO: 204 X10*3/UL (ref 150–450)
PO2 BLDV: 44 MM HG (ref 35–45)
POTASSIUM BLDV-SCNC: 4 MMOL/L (ref 3.5–5.3)
POTASSIUM SERPL-SCNC: 4 MMOL/L (ref 3.5–5.3)
PROT SERPL-MCNC: 6.7 G/DL (ref 6.4–8.2)
PROT UR STRIP.AUTO-MCNC: NEGATIVE MG/DL
RBC # BLD AUTO: 4.73 X10*6/UL (ref 4.5–5.9)
RBC # UR STRIP.AUTO: NEGATIVE /UL
RH FACTOR (ANTIGEN D): NORMAL
SAO2 % BLDV: 60 % (ref 45–75)
SODIUM BLDV-SCNC: 135 MMOL/L (ref 136–145)
SODIUM SERPL-SCNC: 137 MMOL/L (ref 136–145)
SP GR UR STRIP.AUTO: 1.01
UROBILINOGEN UR STRIP.AUTO-MCNC: NORMAL MG/DL
WBC # BLD AUTO: 5.9 X10*3/UL (ref 4.4–11.3)

## 2024-04-09 PROCEDURE — 84132 ASSAY OF SERUM POTASSIUM: CPT

## 2024-04-09 PROCEDURE — G0378 HOSPITAL OBSERVATION PER HR: HCPCS

## 2024-04-09 PROCEDURE — 81003 URINALYSIS AUTO W/O SCOPE: CPT

## 2024-04-09 PROCEDURE — 86901 BLOOD TYPING SEROLOGIC RH(D): CPT | Performed by: SURGERY

## 2024-04-09 PROCEDURE — 83690 ASSAY OF LIPASE: CPT

## 2024-04-09 PROCEDURE — 36415 COLL VENOUS BLD VENIPUNCTURE: CPT

## 2024-04-09 PROCEDURE — 2500000004 HC RX 250 GENERAL PHARMACY W/ HCPCS (ALT 636 FOR OP/ED): Performed by: NURSE PRACTITIONER

## 2024-04-09 PROCEDURE — 99285 EMERGENCY DEPT VISIT HI MDM: CPT | Mod: 25

## 2024-04-09 PROCEDURE — 96374 THER/PROPH/DIAG INJ IV PUSH: CPT

## 2024-04-09 PROCEDURE — 2500000001 HC RX 250 WO HCPCS SELF ADMINISTERED DRUGS (ALT 637 FOR MEDICARE OP): Performed by: NURSE PRACTITIONER

## 2024-04-09 PROCEDURE — 85025 COMPLETE CBC W/AUTO DIFF WBC: CPT

## 2024-04-09 PROCEDURE — 96375 TX/PRO/DX INJ NEW DRUG ADDON: CPT

## 2024-04-09 PROCEDURE — 2550000001 HC RX 255 CONTRASTS

## 2024-04-09 PROCEDURE — 74177 CT ABD & PELVIS W/CONTRAST: CPT | Mod: FOREIGN READ | Performed by: RADIOLOGY

## 2024-04-09 PROCEDURE — 36415 COLL VENOUS BLD VENIPUNCTURE: CPT | Performed by: SURGERY

## 2024-04-09 PROCEDURE — 2500000004 HC RX 250 GENERAL PHARMACY W/ HCPCS (ALT 636 FOR OP/ED)

## 2024-04-09 PROCEDURE — 99221 1ST HOSP IP/OBS SF/LOW 40: CPT | Performed by: SURGERY

## 2024-04-09 PROCEDURE — 96361 HYDRATE IV INFUSION ADD-ON: CPT

## 2024-04-09 PROCEDURE — 93005 ELECTROCARDIOGRAM TRACING: CPT

## 2024-04-09 PROCEDURE — 83605 ASSAY OF LACTIC ACID: CPT

## 2024-04-09 PROCEDURE — 74177 CT ABD & PELVIS W/CONTRAST: CPT

## 2024-04-09 PROCEDURE — 99222 1ST HOSP IP/OBS MODERATE 55: CPT | Performed by: INTERNAL MEDICINE

## 2024-04-09 RX ORDER — ACETAMINOPHEN 160 MG/5ML
650 SOLUTION ORAL EVERY 4 HOURS PRN
Status: DISCONTINUED | OUTPATIENT
Start: 2024-04-09 | End: 2024-04-10 | Stop reason: HOSPADM

## 2024-04-09 RX ORDER — ONDANSETRON HYDROCHLORIDE 2 MG/ML
4 INJECTION, SOLUTION INTRAVENOUS ONCE
Status: COMPLETED | OUTPATIENT
Start: 2024-04-09 | End: 2024-04-09

## 2024-04-09 RX ORDER — DEXTROSE 50 % IN WATER (D50W) INTRAVENOUS SYRINGE
12.5
Status: DISCONTINUED | OUTPATIENT
Start: 2024-04-09 | End: 2024-04-09

## 2024-04-09 RX ORDER — DEXTROSE 50 % IN WATER (D50W) INTRAVENOUS SYRINGE
25
Status: DISCONTINUED | OUTPATIENT
Start: 2024-04-09 | End: 2024-04-10 | Stop reason: HOSPADM

## 2024-04-09 RX ORDER — INSULIN LISPRO 100 [IU]/ML
3 INJECTION, SOLUTION INTRAVENOUS; SUBCUTANEOUS AS NEEDED
Status: DISCONTINUED | OUTPATIENT
Start: 2024-04-09 | End: 2024-04-10 | Stop reason: HOSPADM

## 2024-04-09 RX ORDER — ACETAMINOPHEN 650 MG/1
650 SUPPOSITORY RECTAL EVERY 4 HOURS PRN
Status: DISCONTINUED | OUTPATIENT
Start: 2024-04-09 | End: 2024-04-10 | Stop reason: HOSPADM

## 2024-04-09 RX ORDER — MORPHINE SULFATE 4 MG/ML
4 INJECTION, SOLUTION INTRAMUSCULAR; INTRAVENOUS ONCE
Status: COMPLETED | OUTPATIENT
Start: 2024-04-09 | End: 2024-04-09

## 2024-04-09 RX ORDER — ATORVASTATIN CALCIUM 20 MG/1
20 TABLET, FILM COATED ORAL NIGHTLY
Status: DISCONTINUED | OUTPATIENT
Start: 2024-04-09 | End: 2024-04-10 | Stop reason: HOSPADM

## 2024-04-09 RX ORDER — PANTOPRAZOLE SODIUM 40 MG/1
40 TABLET, DELAYED RELEASE ORAL
Status: DISCONTINUED | OUTPATIENT
Start: 2024-04-10 | End: 2024-04-10 | Stop reason: HOSPADM

## 2024-04-09 RX ORDER — MORPHINE SULFATE 2 MG/ML
2 INJECTION, SOLUTION INTRAMUSCULAR; INTRAVENOUS EVERY 4 HOURS PRN
Status: DISCONTINUED | OUTPATIENT
Start: 2024-04-09 | End: 2024-04-10 | Stop reason: HOSPADM

## 2024-04-09 RX ORDER — CHOLECALCIFEROL (VITAMIN D3) 50 MCG
50 TABLET ORAL DAILY
COMMUNITY

## 2024-04-09 RX ORDER — ENOXAPARIN SODIUM 100 MG/ML
40 INJECTION SUBCUTANEOUS EVERY 24 HOURS
Status: DISCONTINUED | OUTPATIENT
Start: 2024-04-10 | End: 2024-04-10 | Stop reason: HOSPADM

## 2024-04-09 RX ORDER — ONDANSETRON 4 MG/1
4 TABLET, FILM COATED ORAL EVERY 8 HOURS PRN
Status: DISCONTINUED | OUTPATIENT
Start: 2024-04-09 | End: 2024-04-10 | Stop reason: HOSPADM

## 2024-04-09 RX ORDER — ASPIRIN 81 MG/1
81 TABLET ORAL DAILY
COMMUNITY

## 2024-04-09 RX ORDER — ONDANSETRON HYDROCHLORIDE 2 MG/ML
4 INJECTION, SOLUTION INTRAVENOUS EVERY 8 HOURS PRN
Status: DISCONTINUED | OUTPATIENT
Start: 2024-04-09 | End: 2024-04-10 | Stop reason: HOSPADM

## 2024-04-09 RX ORDER — POLYETHYLENE GLYCOL 3350 17 G/17G
17 POWDER, FOR SOLUTION ORAL DAILY
Status: DISCONTINUED | OUTPATIENT
Start: 2024-04-09 | End: 2024-04-10 | Stop reason: HOSPADM

## 2024-04-09 RX ORDER — ASPIRIN 81 MG/1
81 TABLET ORAL DAILY
Status: DISCONTINUED | OUTPATIENT
Start: 2024-04-09 | End: 2024-04-09

## 2024-04-09 RX ORDER — PANTOPRAZOLE SODIUM 40 MG/10ML
40 INJECTION, POWDER, LYOPHILIZED, FOR SOLUTION INTRAVENOUS
Status: DISCONTINUED | OUTPATIENT
Start: 2024-04-10 | End: 2024-04-10 | Stop reason: HOSPADM

## 2024-04-09 RX ORDER — DEXTROSE 50 % IN WATER (D50W) INTRAVENOUS SYRINGE
12.5
Status: DISCONTINUED | OUTPATIENT
Start: 2024-04-09 | End: 2024-04-10 | Stop reason: HOSPADM

## 2024-04-09 RX ORDER — DEXTROSE 50 % IN WATER (D50W) INTRAVENOUS SYRINGE
25
Status: DISCONTINUED | OUTPATIENT
Start: 2024-04-09 | End: 2024-04-09

## 2024-04-09 RX ORDER — SODIUM CHLORIDE 9 MG/ML
75 INJECTION, SOLUTION INTRAVENOUS CONTINUOUS
Status: DISCONTINUED | OUTPATIENT
Start: 2024-04-09 | End: 2024-04-10 | Stop reason: HOSPADM

## 2024-04-09 RX ORDER — ACETAMINOPHEN 325 MG/1
650 TABLET ORAL EVERY 4 HOURS PRN
Status: DISCONTINUED | OUTPATIENT
Start: 2024-04-09 | End: 2024-04-10 | Stop reason: HOSPADM

## 2024-04-09 RX ADMIN — SODIUM CHLORIDE 75 ML/HR: 9 INJECTION, SOLUTION INTRAVENOUS at 16:31

## 2024-04-09 RX ADMIN — ATORVASTATIN CALCIUM 20 MG: 20 TABLET, FILM COATED ORAL at 21:55

## 2024-04-09 RX ADMIN — ONDANSETRON 4 MG: 2 INJECTION INTRAMUSCULAR; INTRAVENOUS at 11:08

## 2024-04-09 RX ADMIN — SODIUM CHLORIDE 1000 ML: 9 INJECTION, SOLUTION INTRAVENOUS at 11:06

## 2024-04-09 RX ADMIN — IOHEXOL 75 ML: 350 INJECTION, SOLUTION INTRAVENOUS at 13:00

## 2024-04-09 RX ADMIN — MORPHINE SULFATE 4 MG: 4 INJECTION, SOLUTION INTRAMUSCULAR; INTRAVENOUS at 11:08

## 2024-04-09 RX ADMIN — POLYETHYLENE GLYCOL 3350 17 G: 17 POWDER, FOR SOLUTION ORAL at 16:35

## 2024-04-09 SDOH — SOCIAL STABILITY: SOCIAL INSECURITY: DOES ANYONE TRY TO KEEP YOU FROM HAVING/CONTACTING OTHER FRIENDS OR DOING THINGS OUTSIDE YOUR HOME?: NO

## 2024-04-09 SDOH — SOCIAL STABILITY: SOCIAL INSECURITY: DO YOU FEEL UNSAFE GOING BACK TO THE PLACE WHERE YOU ARE LIVING?: NO

## 2024-04-09 SDOH — SOCIAL STABILITY: SOCIAL INSECURITY: HAVE YOU HAD THOUGHTS OF HARMING ANYONE ELSE?: NO

## 2024-04-09 SDOH — SOCIAL STABILITY: SOCIAL INSECURITY: ABUSE: ADULT

## 2024-04-09 SDOH — SOCIAL STABILITY: SOCIAL INSECURITY: WERE YOU ABLE TO COMPLETE ALL THE BEHAVIORAL HEALTH SCREENINGS?: YES

## 2024-04-09 SDOH — SOCIAL STABILITY: SOCIAL INSECURITY: HAS ANYONE EVER THREATENED TO HURT YOUR FAMILY OR YOUR PETS?: NO

## 2024-04-09 SDOH — SOCIAL STABILITY: SOCIAL INSECURITY: DO YOU FEEL ANYONE HAS EXPLOITED OR TAKEN ADVANTAGE OF YOU FINANCIALLY OR OF YOUR PERSONAL PROPERTY?: NO

## 2024-04-09 SDOH — SOCIAL STABILITY: SOCIAL INSECURITY: ARE YOU OR HAVE YOU BEEN THREATENED OR ABUSED PHYSICALLY, EMOTIONALLY, OR SEXUALLY BY ANYONE?: NO

## 2024-04-09 SDOH — SOCIAL STABILITY: SOCIAL INSECURITY: ARE THERE ANY APPARENT SIGNS OF INJURIES/BEHAVIORS THAT COULD BE RELATED TO ABUSE/NEGLECT?: NO

## 2024-04-09 ASSESSMENT — ACTIVITIES OF DAILY LIVING (ADL)
WALKS IN HOME: INDEPENDENT
HEARING - LEFT EAR: FUNCTIONAL
TOILETING: INDEPENDENT
FEEDING YOURSELF: INDEPENDENT
JUDGMENT_ADEQUATE_SAFELY_COMPLETE_DAILY_ACTIVITIES: YES
PATIENT'S MEMORY ADEQUATE TO SAFELY COMPLETE DAILY ACTIVITIES?: YES
HEARING - RIGHT EAR: FUNCTIONAL
GROOMING: INDEPENDENT
ADEQUATE_TO_COMPLETE_ADL: YES
BATHING: INDEPENDENT
LACK_OF_TRANSPORTATION: NO
DRESSING YOURSELF: INDEPENDENT

## 2024-04-09 ASSESSMENT — COGNITIVE AND FUNCTIONAL STATUS - GENERAL
PATIENT BASELINE BEDBOUND: NO
DAILY ACTIVITIY SCORE: 24
MOBILITY SCORE: 24

## 2024-04-09 ASSESSMENT — PAIN SCALES - GENERAL: PAINLEVEL_OUTOF10: 3

## 2024-04-09 ASSESSMENT — ENCOUNTER SYMPTOMS
RESPIRATORY NEGATIVE: 1
CARDIOVASCULAR NEGATIVE: 1
APPETITE CHANGE: 1
EYES NEGATIVE: 1
GASTROINTESTINAL NEGATIVE: 1
CONSTITUTIONAL NEGATIVE: 1
ABDOMINAL PAIN: 1
PSYCHIATRIC NEGATIVE: 1
NEUROLOGICAL NEGATIVE: 1
MUSCULOSKELETAL NEGATIVE: 1

## 2024-04-09 ASSESSMENT — LIFESTYLE VARIABLES
HOW MANY STANDARD DRINKS CONTAINING ALCOHOL DO YOU HAVE ON A TYPICAL DAY: PATIENT DOES NOT DRINK
HOW OFTEN DO YOU HAVE A DRINK CONTAINING ALCOHOL: NEVER
SKIP TO QUESTIONS 9-10: 1
AUDIT-C TOTAL SCORE: 0
AUDIT-C TOTAL SCORE: 0
HOW OFTEN DO YOU HAVE 6 OR MORE DRINKS ON ONE OCCASION: NEVER

## 2024-04-09 ASSESSMENT — COLUMBIA-SUICIDE SEVERITY RATING SCALE - C-SSRS
2. HAVE YOU ACTUALLY HAD ANY THOUGHTS OF KILLING YOURSELF?: NO
1. IN THE PAST MONTH, HAVE YOU WISHED YOU WERE DEAD OR WISHED YOU COULD GO TO SLEEP AND NOT WAKE UP?: NO
6. HAVE YOU EVER DONE ANYTHING, STARTED TO DO ANYTHING, OR PREPARED TO DO ANYTHING TO END YOUR LIFE?: NO

## 2024-04-09 ASSESSMENT — PATIENT HEALTH QUESTIONNAIRE - PHQ9
2. FEELING DOWN, DEPRESSED OR HOPELESS: NOT AT ALL
1. LITTLE INTEREST OR PLEASURE IN DOING THINGS: NOT AT ALL
SUM OF ALL RESPONSES TO PHQ9 QUESTIONS 1 & 2: 0

## 2024-04-09 ASSESSMENT — PAIN DESCRIPTION - LOCATION: LOCATION: ABDOMEN

## 2024-04-09 ASSESSMENT — PAIN - FUNCTIONAL ASSESSMENT: PAIN_FUNCTIONAL_ASSESSMENT: 0-10

## 2024-04-09 NOTE — CARE PLAN
The patient's goals for the shift include      The clinical goals for the shift include To get surgery      Problem: Pain - Adult  Goal: Verbalizes/displays adequate comfort level or baseline comfort level  Outcome: Progressing     Problem: Safety - Adult  Goal: Free from fall injury  Outcome: Progressing     Problem: Discharge Planning  Goal: Discharge to home or other facility with appropriate resources  Outcome: Progressing     Problem: Chronic Conditions and Co-morbidities  Goal: Patient's chronic conditions and co-morbidity symptoms are monitored and maintained or improved  Outcome: Progressing      123

## 2024-04-09 NOTE — ED TRIAGE NOTES
PT presents to ED for an acute onset of RUQ and LUQ abdominal pain. Pt is GCS 15, has a patent airway, and appears uncomfortable at time of triage.   Pt reports 1x hour ago he developed a rapid onset of abdominal pain, that is described 5/10 squeezing pain.

## 2024-04-09 NOTE — PROGRESS NOTES
04/09/24 1415   [facility] Disability Status   Are you deaf or do you have serious difficulty hearing? N   Are you blind or do you have serious difficulty seeing, even when wearing glasses? N   Because of a physical, mental, or emotional condition, do you have serious difficulty concentrating, remembering, or making decisions? (5 years old or older) N   Do you have serious difficulty walking or climbing stairs? N   Do you have serious difficulty dressing or bathing? N   Because of a physical, mental, or emotional condition, do you have serious difficulty doing errands alone such as visiting the doctor? N

## 2024-04-09 NOTE — CONSULTS
Reason For Consult  Incarcerated hernia      History Of Present Illness  Lobito Walker is a 58 y.o. male presenting with 1 day of increasing periumbilical pain.  Was unaware he had an umbilical hernia.  Newly onset today.  Last gas was this morning.  Last p.o. intake around 730.  No prior abdominal surgeries.  Colonoscopy 2019.  No current anticoagulation.  Known history of type 1 diabetes currently with an insulin pump.     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), Benign neoplasm, unspecified site (10/10/2017), Disorder of the skin and subcutaneous tissue, unspecified (10/05/2015), Hyperlipidemia, unspecified (06/21/2022), Other conditions influencing health status (10/10/2017), Other hypertrophic disorders of the skin (10/05/2015), Otitis media, unspecified, right ear (03/12/2021), Pain in left foot (10/29/2018), Pain in left leg (07/13/2019), Pain in unspecified wrist (12/26/2020), Personal history of diseases of the skin and subcutaneous tissue (08/26/2013), Personal history of other diseases of the circulatory system (12/29/2015), Personal history of other diseases of the respiratory system (07/13/2019), Personal history of other specified conditions (10/10/2017), Polyp of colon (10/10/2017), Superficial mycosis, unspecified (06/03/2020), Type 1 diabetes mellitus without complications (CMS/[facility]) (12/19/2022), Unspecified acute noninfective otitis externa, right ear (04/02/2021), Unspecified injury of right wrist, hand and finger(s), initial encounter (12/28/2020), Unspecified injury of unspecified wrist, hand and finger(s), initial encounter, and Unspecified injury of unspecified wrist, hand and finger(s), initial encounter.    Surgical History  He has a past surgical history that includes Other surgical history (08/26/2013); Colonoscopy (01/15/2019); and Other surgical history (06/21/2022).     Social History  He reports  "that he has never smoked. He has never been exposed to tobacco smoke. He has never used smokeless tobacco. He reports that he does not currently use alcohol. He reports that he does not use drugs.    Family History  Family History   Problem Relation Name Age of Onset    Other (bypass) Father      Colon cancer Maternal Grandmother          Allergies  Patient has no known allergies.    Review of Systems  Review of Systems   Constitutional: Negative.    HENT: Negative.     Eyes: Negative.    Respiratory: Negative.     Cardiovascular: Negative.    Gastrointestinal: Negative.    Genitourinary: Negative.    Skin: Negative.    All other systems reviewed and are negative.        Physical Exam  Physical Exam  Constitutional:       Appearance: Normal appearance.   HENT:      Head: Normocephalic.   Eyes:      Pupils: Pupils are equal, round, and reactive to light.   Cardiovascular:      Rate and Rhythm: Normal rate.   Pulmonary:      Effort: Pulmonary effort is normal.   Abdominal:      General: Abdomen is flat. Bowel sounds are normal.      Palpations: Abdomen is soft.      Comments: Soft but incarcerated periumbilical hernia.  Unable to be reduced despite patient positioning, manipulation, pain medication.   Skin:     General: Skin is warm.   Neurological:      General: No focal deficit present.      Mental Status: He is alert.           Last Recorded Vitals  Blood pressure 127/75, pulse 85, temperature 36.6 °C (97.9 °F), temperature source Oral, resp. rate (!) 22, height 1.905 m (6' 3\"), weight 122 kg (270 lb), SpO2 96 %.    Relevant Results  CT abdomen pelvis w IV contrast    Result Date: 4/9/2024  STUDY: CT Abdomen and Pelvis with IV Contrast; 4/9/2024 at 1:09 PM. INDICATION: Abdominal pain, firm mass likely hernia in central abdomen/periumbilical area. COMPARISON: None available. ACCESSION NUMBER(S): [id number] ORDERING CLINICIAN: MARLENE RAMOS TECHNIQUE: CT of the abdomen and pelvis was performed.  Contiguous axial " images were obtained at 3 mm slice thickness through the abdomen and pelvis. Coronal and sagittal reconstructions at 3 mm slice thickness were performed.  Omnipaque 350 75 mL was administered intravenously.  FINDINGS: LOWER CHEST: No cardiomegaly.  No pericardial effusion.  There is minimal basilar atelectasis.  ABDOMEN:  LIVER: No hepatomegaly.  Smooth surface contour.  Liver demonstrates fatty morphology.  BILE DUCTS: No intrahepatic or extrahepatic biliary ductal dilatation.  GALLBLADDER: The gallbladder is unremarkable. STOMACH: No abnormalities identified.  PANCREAS: No masses or ductal dilatation.  SPLEEN: No splenomegaly or focal splenic lesion.  ADRENAL GLANDS: No thickening or nodules.  KIDNEYS AND URETERS: Kidneys are normal in size and location.  No renal or ureteral calculi.  There is small left renal cyst.  PELVIS:  BLADDER: No abnormalities identified.  REPRODUCTIVE ORGANS: No abnormalities identified.  BOWEL: No abnormalities identified.  VESSELS: No abnormalities identified.  Abdominal aorta is normal in caliber.  PERITONEUM/RETROPERITONEUM/LYMPH NODES: No free fluid.  No pneumoperitoneum. No lymphadenopathy.  ABDOMINAL WALL: There is periumbilical fat-containing hernia.  Hernia measures 1.6 x 2.4 cm. SOFT TISSUES: No abnormalities identified.  BONES: No acute fracture or aggressive osseous lesion.    Periumbilical fat-containing hernia. No other acute inflammatory changes in the abdomen and pelvis. Signed by Reid Sim, DO        Assessment/Plan   Problem List Items Addressed This Visit          Gastrointestinal and Abdominal    * (Principal) Umbilical hernia without obstruction and without gangrene    Relevant Orders    Case Request Operating Room: Repair Umbilical Hernia (Completed)     Other Visit Diagnoses       Umbilical hernia, incarcerated    -  Primary           CT reviewed and this appears to be incarcerated fat-containing less than 2 cm umbilical hernia.  Given symptoms and  incarceration plan for open hernia repair today, possible mesh  Discussed risks of surgery including bleeding, injury, ileus, hernia recurrence, infection  Plan for admission to the hospitalist service given the patient's type 1 diabetes and insulin pump  I spent 45 minutes in the professional and overall care of this patient.      Gene Rodriguez MD

## 2024-04-09 NOTE — ED PROVIDER NOTES
HPI   Chief Complaint   Patient presents with    Abdominal Pain       HPI  HISTORY OF PRESENT ILLNESS:  58 y.o. male presenting to the ED with complaint of abdominal pain that began about 2 hours prior to arrival.  He states that he was in his usual state of health yesterday and this morning upon awakening, felt fine with no symptoms until about 2 hours ago.  He developed relatively sudden onset of central abdominal pain while at work.  Vilas that he may need to have a bowel movement, tried to go but was unable to pass any stool or flatus.  He states the pain is in the mid abdomen, constant, aching and squeezing pain.  Better when he lays still, worse with any movement.  States that he called his wife and said that he may need a ride home from work due to the pain, and she recommended he come to the ED for evaluation.  He has no nausea or vomiting.  No melena or hematochezia.  No preceding diarrhea.  No back or flank pain.  Pain is nonradiating.  No chest pain or shortness of breath.  No fevers or chills.  No dizziness or lightheadedness, no syncope.  No urinary symptoms, denies dysuria, hematuria, urine urgency or frequency.  No recent illness.  No medications taken for symptoms.  No other complaints or symptoms voiced.  Denies any prior abdominal surgeries.    12 point review of systems was performed and is negative unless otherwise specified in HPI.    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    Benign neoplasm, unspecified site 10/10/2017    Tubular adenoma    Disorder of the skin and subcutaneous tissue, unspecified 10/05/2015    Skin lesion    Hyperlipidemia, 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/21/2022    Hyperlipidemia    Other conditions influencing health status 10/10/2017    Weight loss, intentional    Other hypertrophic  disorders of the skin 10/05/2015    Skin tag    Otitis media, unspecified, right ear 03/12/2021    Right acute otitis media    Pain in left foot 10/29/2018    Left foot pain    Pain in left leg 07/13/2019    Pain of left lower extremity    Pain in unspecified wrist 12/26/2020    Wrist pain    Personal history of diseases of the skin and subcutaneous tissue 08/26/2013    History of local infection of skin and subcutaneous tissue    Personal history of other diseases of the circulatory system 12/29/2015    History of coronary atherosclerosis    Personal history of other diseases of the respiratory system 07/13/2019    History of acute bronchitis    Personal history of other specified conditions 10/10/2017    History of weight loss    Polyp of colon 10/10/2017    Benign colon polyp    Superficial mycosis, 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/03/2020    Fungal dermatitis    Type 1 diabetes mellitus without complications (CMS/[facility]) 12/19/2022    Type 1 diabetes mellitus    Unspecified acute noninfective otitis externa, right ear 04/02/2021    Acute otitis externa of right ear, unspecified type    Unspecified injury of right wrist, hand and finger(s), initial encounter 12/28/2020    Injury of right hand, initial encounter    Unspecified injury of unspecified wrist, hand and finger(s), initial encounter     Hand injury    Unspecified injury of unspecified wrist, hand and finger(s), initial encounter     Finger injury         Abnormal Labs Reviewed - No abnormal labs to display   CT abdomen pelvis w IV contrast    (Results Pending)               Aruna Coma Scale Score: 15                     Patient History   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    Acquired plantar porokeratosis    Benign neoplasm, unspecified site 10/10/2017    Tubular adenoma    Disorder of the skin and subcutaneous tissue, unspecified 10/05/2015    Skin lesion    Hyperlipidemia, 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/21/2022     Hyperlipidemia    Other conditions influencing health status 10/10/2017    Weight loss, intentional    Other hypertrophic disorders of the skin 10/05/2015    Skin tag    Otitis media, unspecified, right ear 03/12/2021    Right acute otitis media    Pain in left foot 10/29/2018    Left foot pain    Pain in left leg 07/13/2019    Pain of left lower extremity    Pain in unspecified wrist 12/26/2020    Wrist pain    Personal history of diseases of the skin and subcutaneous tissue 08/26/2013    History of local infection of skin and subcutaneous tissue    Personal history of other diseases of the circulatory system 12/29/2015    History of coronary atherosclerosis    Personal history of other diseases of the respiratory system 07/13/2019    History of acute bronchitis    Personal history of other specified conditions 10/10/2017    History of weight loss    Polyp of colon 10/10/2017    Benign colon polyp    Superficial mycosis, 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/03/2020    Fungal dermatitis    Type 1 diabetes mellitus without complications (CMS/[facility]) 12/19/2022    Type 1 diabetes mellitus    Unspecified acute noninfective otitis externa, right ear 04/02/2021    Acute otitis externa of right ear, unspecified type    Unspecified injury of right wrist, hand and finger(s), initial encounter 12/28/2020    Injury of right hand, initial encounter    Unspecified injury of unspecified wrist, hand and finger(s), initial encounter     Hand injury    Unspecified injury of unspecified wrist, hand and finger(s), initial encounter     Finger injury     Past Surgical History:   Procedure Laterality Date    COLONOSCOPY  01/15/2019    Complete Colonoscopy    OTHER SURGICAL HISTORY  08/26/2013    Wrist Surgery    OTHER SURGICAL HISTORY  06/21/2022    Blepharoplasty     Family History   Problem Relation Name Age of Onset    Other (bypass) Father      Colon cancer Maternal Grandmother       Social History     Tobacco Use    Smoking status: Never     Passive  exposure: Never    Smokeless tobacco: Never   Vaping Use    Vaping Use: Never used   Substance Use Topics    Alcohol use: Not Currently    Drug use: Never       Physical Exam   ED Triage Vitals [04/09/24 1027]   Temperature Heart Rate Respirations BP   36.6 °C (97.9 °F) 85 (!) 22 142/84      Pulse Ox Temp Source Heart Rate Source Patient Position   98 % Oral -- Sitting      BP Location FiO2 (%)     Right leg --       Physical Exam  Constitutional:       General: He is not in acute distress.     Appearance: He is not toxic-appearing.      Comments: Appears uncomfortable. Lying still in cot.   HENT:      Head: Normocephalic and atraumatic.      Nose: No congestion.      Mouth/Throat:      Mouth: Mucous membranes are moist.   Eyes:      General: No scleral icterus.     Extraocular Movements: Extraocular movements intact.   Cardiovascular:      Rate and Rhythm: Normal rate and regular rhythm.      Pulses: Normal pulses.   Pulmonary:      Effort: Pulmonary effort is normal. No respiratory distress.   Abdominal:      General: Abdomen is protuberant.      Palpations: Abdomen is soft. There is mass. There is no pulsatile mass.      Tenderness: There is abdominal tenderness in the periumbilical area. There is no right CVA tenderness, left CVA tenderness, guarding or rebound.      Comments: Tenderness in the central abdomen and periumbilical area, where there is an underlying large firm mass, likely umbilical hernia, patient denies known history of hernia.  No overlying skin changes, no erythema or ecchymosis.  Unable to manually reduce. Ice pack applied. There is no guarding or rigidity, no peritoneal signs.  No pulsatile mass.   Musculoskeletal:         General: Normal range of motion.      Cervical back: Normal range of motion and neck supple. No rigidity.      Right lower leg: No edema.      Left lower leg: No edema.   Skin:     General: Skin is warm and dry.      Capillary Refill: Capillary refill takes less than 2  seconds.   Neurological:      General: No focal deficit present.      Mental Status: He is alert and oriented to person, place, and time.      Cranial Nerves: No cranial nerve deficit.      Motor: No weakness.      Gait: Gait normal.   Psychiatric:         Mood and Affect: Mood normal.         Behavior: Behavior normal.         Judgment: Judgment normal.         ED Course & MDM   ED Course as of 04/09/24 1451   Tue Apr 09, 2024   1020 Ice pack applied after initial evaluation. []   1047 10:31 12 lead EKG interpreted by myself and my ED attending reveals sinus rhythm with a rate of 79 beats per minute.  Normal Axis.  There are no ST elevations. T wave inversions in V1. No acute ischemic changes identified.  [EH]      ED Course User Index  [EH] Vani Grande PA-C         Diagnoses as of 04/09/24 1451   Umbilical hernia, incarcerated       Medical Decision Making  ED course / MDM     Summary:  Patient presented with abdominal pain that began about 2 hours prior to arrival.  Vital signs are stable, patient appears nontoxic but uncomfortable.  On exam, there is a large likely umbilical hernia and central abdomen, tender, unable to manually reduce.  No overlying skin changes.  Remainder of abdomen is nontender, no rigidity.  No CVA tenderness.  Lungs clear to auscultation, heart regular rate and rhythm.  Will obtain labs and CT scan, and patient given IV fluids, Zofran, and morphine.  Labs show no leukocytosis, normal hemoglobin, glucose 113, no other electrolyte abnormalities, normal kidney and liver function.  Normal lipase and lactate, UA noninfected.  On my initial review of the CT scan, umbilical hernia present.  Is incarcerated on my exam.  Discussed with surgical CARLOS MANUEL, surgical team will evaluate the patient in the ED.  Surgical team and general surgery attending Dr. Rodriguez evaluated the patient in the ED, also unable to manually reduce hernia, plan to take the patient to the OR, recommend medicine admission.   Patient will remain NPO.  CT scan shows a periumbilical fat-containing hernia, no other acute inflammatory changes in the abdomen and pelvis.  Patient case discussed with ED attending Dr. Hdz, who also saw and evaluated the patient. Results and differential were discussed in detail with the patient. He is in agreement with the plan for admission. Accepted for observation.    Impression:  1. See diagnosis     Disposition: Admission    Patient seen and discussed with Dr. Hdz    This note has been transcribed using voice recognition and may contain grammatical errors, misplaced words, incorrect words, incorrect phrases or other errors.   Procedure  Procedures     Vani Grande PA-C  04/09/24 1530

## 2024-04-09 NOTE — PROGRESS NOTES
"Pharmacy Medication History Review    Emiliano Walker \"Bryn" is a 58 y.o. male admitted for Umbilical hernia without obstruction and without gangrene. Pharmacy reviewed the patient's [id number] medications and allergies for accuracy.    The list below reflectives the updated PTA list. Please review each medication in order reconciliation for additional clarification and justification.  Prior to Admission Medications   Prescriptions Last Dose Informant   Dexcom G6 Transmitter device Unknown Self   ascorbic acid (Vitamin C) 250 mg tablet 4/9/2024 Self   Sig: Take 1 tablet (250 mg) by mouth once daily.   aspirin 81 mg EC tablet 4/9/2024 Self   Sig: Take 1 tablet (81 mg) by mouth once daily.   atorvastatin (Lipitor) 20 mg tablet 4/9/2024 Self   Sig: TAKE 1 TABLET BY MOUTH ONE TIME DAILY   cholecalciferol (Vitamin D3) 50 MCG (2000 UT) tablet 4/9/2024 Self   Sig: Take 1 tablet (50 mcg) by mouth once daily.   insulin lispro (HumaLOG U-100 Insulin) 100 unit/mL injection 4/9/2024 Self   Sig: Continuous subcutaneous insulin pump infusion, 120 units per day.   multivitamin (Daily Multi-Vitamin) tablet 4/9/2024 Self   Sig: Take 1 tablet by mouth once daily.   sildenafil (Viagra) 100 mg tablet Unknown Self   Sig: TAKE 1 TABLET BY MOUTH ONE TIME DAILY AS NEEDED      Facility-Administered Medications: None         The list below reflectives the updated allergy list. Please review each documented allergy for additional clarification and justification.  Allergies  Reviewed by Mu Sibley, EMT on 4/9/2024   No Known Allergies         Below are additional concerns with the patient's PTA list.  Spoke with Patient    Fletcher De Paz    "

## 2024-04-09 NOTE — PROGRESS NOTES
Transitional Care Coordination Progress Note:  Plan per Medical/Surgical team: treatment of umbilical hernia with morphine, zodran, IV fluids, NPO, CTSCAN pending  Status: ED  Payor source:  employee  Discharge disposition: Home with wife   Potential Barriers: ?surgery today  ADOD: 4/11/2024  MAN Murray RN, BSN Transitional Care Coordinator ED# [phone]      04/09/24 1416   Discharge Planning   Living Arrangements Spouse/significant other   Support Systems Spouse/significant other   Assistance Needed ?surgery   Type of Residence Private residence   Number of Stairs to Enter Residence 3   Number of Stairs Within Residence 12  (to basement)   Home or Post Acute Services None   Patient expects to be discharged to: Home with wife   Does the patient need discharge transport arranged? Yes   RoundTrip coordination needed? Yes   Has discharge transport been arranged? No   Financial Resource Strain   How hard is it for you to pay for the very basics like food, housing, medical care, and heating? Not hard   Housing Stability   In the last 12 months, was there a time when you were not able to pay the mortgage or rent on time? N   In the last 12 months, how many places have you lived? 1   In the last 12 months, was there a time when you did not have a steady place to sleep or slept in a shelter (including now)? N   Transportation Needs   In the past 12 months, has lack of transportation kept you from medical appointments or from getting medications? no   In the past 12 months, has lack of transportation kept you from meetings, work, or from getting things needed for daily living? No

## 2024-04-09 NOTE — PROGRESS NOTES
Home with wife      04/09/24 3979   Current Planned Discharge Disposition   Current Planned Discharge Disposition Home

## 2024-04-10 ENCOUNTER — ANESTHESIA EVENT (OUTPATIENT)
Dept: OPERATING ROOM | Facility: HOSPITAL | Age: 59
End: 2024-04-10
Payer: COMMERCIAL

## 2024-04-10 ENCOUNTER — ANESTHESIA (OUTPATIENT)
Dept: OPERATING ROOM | Facility: HOSPITAL | Age: 59
End: 2024-04-10
Payer: COMMERCIAL

## 2024-04-10 ENCOUNTER — PHARMACY VISIT (OUTPATIENT)
Dept: PHARMACY | Facility: CLINIC | Age: 59
End: 2024-04-10
Payer: COMMERCIAL

## 2024-04-10 VITALS
OXYGEN SATURATION: 93 % | RESPIRATION RATE: 16 BRPM | HEART RATE: 82 BPM | TEMPERATURE: 98.1 F | HEIGHT: 75 IN | SYSTOLIC BLOOD PRESSURE: 123 MMHG | WEIGHT: 270 LBS | DIASTOLIC BLOOD PRESSURE: 70 MMHG | BODY MASS INDEX: 33.57 KG/M2

## 2024-04-10 LAB
ALBUMIN SERPL BCP-MCNC: 3.8 G/DL (ref 3.4–5)
ALP SERPL-CCNC: 77 U/L (ref 33–120)
ALT SERPL W P-5'-P-CCNC: 22 U/L (ref 10–52)
ANION GAP SERPL CALC-SCNC: 11 MMOL/L (ref 10–20)
AST SERPL W P-5'-P-CCNC: 25 U/L (ref 9–39)
ATRIAL RATE: 79 BPM
BILIRUB SERPL-MCNC: 1.2 MG/DL (ref 0–1.2)
BUN SERPL-MCNC: 10 MG/DL (ref 6–23)
CALCIUM SERPL-MCNC: 8.7 MG/DL (ref 8.6–10.3)
CHLORIDE SERPL-SCNC: 106 MMOL/L (ref 98–107)
CO2 SERPL-SCNC: 24 MMOL/L (ref 21–32)
CREAT SERPL-MCNC: 0.67 MG/DL (ref 0.5–1.3)
EGFRCR SERPLBLD CKD-EPI 2021: >90 ML/MIN/1.73M*2
ERYTHROCYTE [DISTWIDTH] IN BLOOD BY AUTOMATED COUNT: 12.7 % (ref 11.5–14.5)
GLUCOSE SERPL-MCNC: 116 MG/DL (ref 74–99)
HCT VFR BLD AUTO: 42.8 % (ref 41–52)
HGB BLD-MCNC: 14.6 G/DL (ref 13.5–17.5)
MCH RBC QN AUTO: 31.7 PG (ref 26–34)
MCHC RBC AUTO-ENTMCNC: 34.1 G/DL (ref 32–36)
MCV RBC AUTO: 93 FL (ref 80–100)
NRBC BLD-RTO: 0 /100 WBCS (ref 0–0)
P AXIS: 61 DEGREES
P OFFSET: 201 MS
P ONSET: 140 MS
PLATELET # BLD AUTO: 206 X10*3/UL (ref 150–450)
POTASSIUM SERPL-SCNC: 3.9 MMOL/L (ref 3.5–5.3)
PR INTERVAL: 156 MS
PROT SERPL-MCNC: 5.9 G/DL (ref 6.4–8.2)
Q ONSET: 218 MS
QRS COUNT: 13 BEATS
QRS DURATION: 86 MS
QT INTERVAL: 366 MS
QTC CALCULATION(BAZETT): 419 MS
QTC FREDERICIA: 401 MS
R AXIS: 70 DEGREES
RBC # BLD AUTO: 4.6 X10*6/UL (ref 4.5–5.9)
SODIUM SERPL-SCNC: 137 MMOL/L (ref 136–145)
T AXIS: 40 DEGREES
T OFFSET: 401 MS
VENTRICULAR RATE: 79 BPM
WBC # BLD AUTO: 7.8 X10*3/UL (ref 4.4–11.3)

## 2024-04-10 PROCEDURE — RXMED WILLOW AMBULATORY MEDICATION CHARGE

## 2024-04-10 PROCEDURE — 2720000007 HC OR 272 NO HCPCS: Performed by: SURGERY

## 2024-04-10 PROCEDURE — 2500000004 HC RX 250 GENERAL PHARMACY W/ HCPCS (ALT 636 FOR OP/ED): Performed by: ANESTHESIOLOGY

## 2024-04-10 PROCEDURE — 80053 COMPREHEN METABOLIC PANEL: CPT | Performed by: NURSE PRACTITIONER

## 2024-04-10 PROCEDURE — 2500000005 HC RX 250 GENERAL PHARMACY W/O HCPCS: Performed by: INTERNAL MEDICINE

## 2024-04-10 PROCEDURE — 2500000004 HC RX 250 GENERAL PHARMACY W/ HCPCS (ALT 636 FOR OP/ED): Performed by: NURSE ANESTHETIST, CERTIFIED REGISTERED

## 2024-04-10 PROCEDURE — A49592 PR RPR AA HERNIA 1ST < 3 CM NCRC8/STRANGULATED: Performed by: NURSE ANESTHETIST, CERTIFIED REGISTERED

## 2024-04-10 PROCEDURE — 36415 COLL VENOUS BLD VENIPUNCTURE: CPT | Performed by: NURSE PRACTITIONER

## 2024-04-10 PROCEDURE — 2500000005 HC RX 250 GENERAL PHARMACY W/O HCPCS: Performed by: ANESTHESIOLOGY

## 2024-04-10 PROCEDURE — 88307 TISSUE EXAM BY PATHOLOGIST: CPT | Performed by: PATHOLOGY

## 2024-04-10 PROCEDURE — 3600000008 HC OR TIME - EACH INCREMENTAL 1 MINUTE - PROCEDURE LEVEL THREE: Performed by: SURGERY

## 2024-04-10 PROCEDURE — 96375 TX/PRO/DX INJ NEW DRUG ADDON: CPT | Mod: 59

## 2024-04-10 PROCEDURE — 49592 RPR AA HRN 1ST < 3 NCR/STRN: CPT | Performed by: SURGERY

## 2024-04-10 PROCEDURE — 3700000002 HC GENERAL ANESTHESIA TIME - EACH INCREMENTAL 1 MINUTE: Performed by: SURGERY

## 2024-04-10 PROCEDURE — 3600000003 HC OR TIME - INITIAL BASE CHARGE - PROCEDURE LEVEL THREE: Performed by: SURGERY

## 2024-04-10 PROCEDURE — 3700000001 HC GENERAL ANESTHESIA TIME - INITIAL BASE CHARGE: Performed by: SURGERY

## 2024-04-10 PROCEDURE — 2500000005 HC RX 250 GENERAL PHARMACY W/O HCPCS: Performed by: NURSE ANESTHETIST, CERTIFIED REGISTERED

## 2024-04-10 PROCEDURE — 7100000001 HC RECOVERY ROOM TIME - INITIAL BASE CHARGE: Performed by: SURGERY

## 2024-04-10 PROCEDURE — 7100000002 HC RECOVERY ROOM TIME - EACH INCREMENTAL 1 MINUTE: Performed by: SURGERY

## 2024-04-10 PROCEDURE — 2500000004 HC RX 250 GENERAL PHARMACY W/ HCPCS (ALT 636 FOR OP/ED): Performed by: NURSE PRACTITIONER

## 2024-04-10 PROCEDURE — G0378 HOSPITAL OBSERVATION PER HR: HCPCS

## 2024-04-10 PROCEDURE — 99239 HOSP IP/OBS DSCHRG MGMT >30: CPT | Performed by: INTERNAL MEDICINE

## 2024-04-10 PROCEDURE — 88307 TISSUE EXAM BY PATHOLOGIST: CPT | Mod: TC,AHULAB

## 2024-04-10 PROCEDURE — 85027 COMPLETE CBC AUTOMATED: CPT | Performed by: NURSE PRACTITIONER

## 2024-04-10 PROCEDURE — 2500000005 HC RX 250 GENERAL PHARMACY W/O HCPCS: Performed by: SURGERY

## 2024-04-10 PROCEDURE — A49592 PR RPR AA HERNIA 1ST < 3 CM NCRC8/STRANGULATED: Performed by: ANESTHESIOLOGY

## 2024-04-10 PROCEDURE — 99222 1ST HOSP IP/OBS MODERATE 55: CPT | Performed by: INTERNAL MEDICINE

## 2024-04-10 RX ORDER — ALBUTEROL SULFATE 0.83 MG/ML
2.5 SOLUTION RESPIRATORY (INHALATION) ONCE AS NEEDED
Status: DISCONTINUED | OUTPATIENT
Start: 2024-04-10 | End: 2024-04-10 | Stop reason: HOSPADM

## 2024-04-10 RX ORDER — IBUPROFEN 600 MG/1
600 TABLET ORAL EVERY 6 HOURS PRN
Qty: 20 TABLET | Refills: 0 | Status: SHIPPED | OUTPATIENT
Start: 2024-04-10

## 2024-04-10 RX ORDER — LIDOCAINE HYDROCHLORIDE 20 MG/ML
INJECTION, SOLUTION INFILTRATION; PERINEURAL AS NEEDED
Status: DISCONTINUED | OUTPATIENT
Start: 2024-04-10 | End: 2024-04-10

## 2024-04-10 RX ORDER — SODIUM CHLORIDE, SODIUM LACTATE, POTASSIUM CHLORIDE, CALCIUM CHLORIDE 600; 310; 30; 20 MG/100ML; MG/100ML; MG/100ML; MG/100ML
100 INJECTION, SOLUTION INTRAVENOUS CONTINUOUS
Status: DISCONTINUED | OUTPATIENT
Start: 2024-04-10 | End: 2024-04-10 | Stop reason: HOSPADM

## 2024-04-10 RX ORDER — ONDANSETRON HYDROCHLORIDE 2 MG/ML
4 INJECTION, SOLUTION INTRAVENOUS ONCE AS NEEDED
Status: DISCONTINUED | OUTPATIENT
Start: 2024-04-10 | End: 2024-04-10 | Stop reason: HOSPADM

## 2024-04-10 RX ORDER — ROCURONIUM BROMIDE 10 MG/ML
INJECTION, SOLUTION INTRAVENOUS AS NEEDED
Status: DISCONTINUED | OUTPATIENT
Start: 2024-04-10 | End: 2024-04-10

## 2024-04-10 RX ORDER — SODIUM CHLORIDE, SODIUM LACTATE, POTASSIUM CHLORIDE, CALCIUM CHLORIDE 600; 310; 30; 20 MG/100ML; MG/100ML; MG/100ML; MG/100ML
INJECTION, SOLUTION INTRAVENOUS CONTINUOUS PRN
Status: DISCONTINUED | OUTPATIENT
Start: 2024-04-10 | End: 2024-04-10

## 2024-04-10 RX ORDER — OXYCODONE HYDROCHLORIDE 5 MG/1
5 TABLET ORAL EVERY 6 HOURS PRN
Qty: 12 TABLET | Refills: 0 | Status: SHIPPED | OUTPATIENT
Start: 2024-04-10

## 2024-04-10 RX ORDER — MIDAZOLAM HYDROCHLORIDE 1 MG/ML
INJECTION INTRAMUSCULAR; INTRAVENOUS AS NEEDED
Status: DISCONTINUED | OUTPATIENT
Start: 2024-04-10 | End: 2024-04-10

## 2024-04-10 RX ORDER — BUPIVACAINE HYDROCHLORIDE 5 MG/ML
INJECTION, SOLUTION PERINEURAL AS NEEDED
Status: DISCONTINUED | OUTPATIENT
Start: 2024-04-10 | End: 2024-04-10 | Stop reason: HOSPADM

## 2024-04-10 RX ORDER — PHENYLEPHRINE HCL IN 0.9% NACL 1 MG/10 ML
SYRINGE (ML) INTRAVENOUS AS NEEDED
Status: DISCONTINUED | OUTPATIENT
Start: 2024-04-10 | End: 2024-04-10

## 2024-04-10 RX ORDER — CEFAZOLIN 1 G/1
INJECTION, POWDER, FOR SOLUTION INTRAVENOUS AS NEEDED
Status: DISCONTINUED | OUTPATIENT
Start: 2024-04-10 | End: 2024-04-10

## 2024-04-10 RX ORDER — ACETAMINOPHEN 325 MG/1
650 TABLET ORAL EVERY 6 HOURS PRN
Qty: 20 TABLET | Refills: 0 | Status: SHIPPED | OUTPATIENT
Start: 2024-04-10

## 2024-04-10 RX ORDER — LABETALOL HYDROCHLORIDE 5 MG/ML
5 INJECTION, SOLUTION INTRAVENOUS ONCE AS NEEDED
Status: DISCONTINUED | OUTPATIENT
Start: 2024-04-10 | End: 2024-04-10 | Stop reason: HOSPADM

## 2024-04-10 RX ORDER — LIDOCAINE HYDROCHLORIDE 10 MG/ML
0.1 INJECTION, SOLUTION EPIDURAL; INFILTRATION; INTRACAUDAL; PERINEURAL ONCE
Status: DISCONTINUED | OUTPATIENT
Start: 2024-04-10 | End: 2024-04-10 | Stop reason: HOSPADM

## 2024-04-10 RX ORDER — FENTANYL CITRATE 50 UG/ML
INJECTION, SOLUTION INTRAMUSCULAR; INTRAVENOUS AS NEEDED
Status: DISCONTINUED | OUTPATIENT
Start: 2024-04-10 | End: 2024-04-10

## 2024-04-10 RX ORDER — POLYETHYLENE GLYCOL 3350 17 G/17G
17 POWDER, FOR SOLUTION ORAL DAILY PRN
Qty: 238 G | Refills: 0 | Status: SHIPPED | OUTPATIENT
Start: 2024-04-10

## 2024-04-10 RX ORDER — PROPOFOL 10 MG/ML
INJECTION, EMULSION INTRAVENOUS AS NEEDED
Status: DISCONTINUED | OUTPATIENT
Start: 2024-04-10 | End: 2024-04-10

## 2024-04-10 RX ORDER — HYDRALAZINE HYDROCHLORIDE 20 MG/ML
5 INJECTION INTRAMUSCULAR; INTRAVENOUS EVERY 30 MIN PRN
Status: DISCONTINUED | OUTPATIENT
Start: 2024-04-10 | End: 2024-04-10 | Stop reason: HOSPADM

## 2024-04-10 RX ORDER — OXYCODONE HYDROCHLORIDE 5 MG/1
5 TABLET ORAL EVERY 4 HOURS PRN
Status: DISCONTINUED | OUTPATIENT
Start: 2024-04-10 | End: 2024-04-10 | Stop reason: HOSPADM

## 2024-04-10 RX ADMIN — DEXAMETHASONE SODIUM PHOSPHATE 4 MG: 4 INJECTION, SOLUTION INTRA-ARTICULAR; INTRALESIONAL; INTRAMUSCULAR; INTRAVENOUS; SOFT TISSUE at 13:47

## 2024-04-10 RX ADMIN — Medication 100 MCG: at 14:15

## 2024-04-10 RX ADMIN — SUGAMMADEX 200 MG: 100 INJECTION, SOLUTION INTRAVENOUS at 14:56

## 2024-04-10 RX ADMIN — MIDAZOLAM HYDROCHLORIDE 1 MG: 1 INJECTION, SOLUTION INTRAMUSCULAR; INTRAVENOUS at 13:27

## 2024-04-10 RX ADMIN — MIDAZOLAM HYDROCHLORIDE 1 MG: 1 INJECTION, SOLUTION INTRAMUSCULAR; INTRAVENOUS at 14:04

## 2024-04-10 RX ADMIN — PROPOFOL 200 MG: 10 INJECTION, EMULSION INTRAVENOUS at 13:37

## 2024-04-10 RX ADMIN — FENTANYL CITRATE 50 MCG: 50 INJECTION, SOLUTION INTRAMUSCULAR; INTRAVENOUS at 14:04

## 2024-04-10 RX ADMIN — FENTANYL CITRATE 50 MCG: 50 INJECTION, SOLUTION INTRAMUSCULAR; INTRAVENOUS at 13:27

## 2024-04-10 RX ADMIN — ONDANSETRON 4 MG: 2 INJECTION INTRAMUSCULAR; INTRAVENOUS at 14:00

## 2024-04-10 RX ADMIN — CEFAZOLIN 3 G: 330 INJECTION, POWDER, FOR SOLUTION INTRAMUSCULAR; INTRAVENOUS at 13:47

## 2024-04-10 RX ADMIN — ROCURONIUM BROMIDE 60 MG: 10 INJECTION, SOLUTION INTRAVENOUS at 13:37

## 2024-04-10 RX ADMIN — SODIUM CHLORIDE, POTASSIUM CHLORIDE, SODIUM LACTATE AND CALCIUM CHLORIDE: 600; 310; 30; 20 INJECTION, SOLUTION INTRAVENOUS at 13:27

## 2024-04-10 RX ADMIN — DEXTROSE MONOHYDRATE 25 G: 25 INJECTION, SOLUTION INTRAVENOUS at 00:02

## 2024-04-10 RX ADMIN — Medication 200 MCG: at 14:29

## 2024-04-10 RX ADMIN — LIDOCAINE HYDROCHLORIDE 100 MG: 20 INJECTION, SOLUTION INFILTRATION; PERINEURAL at 13:37

## 2024-04-10 SDOH — ECONOMIC STABILITY: FOOD INSECURITY: WITHIN THE PAST 12 MONTHS, YOU WORRIED THAT YOUR FOOD WOULD RUN OUT BEFORE YOU GOT MONEY TO BUY MORE.: NEVER TRUE

## 2024-04-10 SDOH — ECONOMIC STABILITY: GENERAL

## 2024-04-10 SDOH — ECONOMIC STABILITY: HOUSING INSECURITY

## 2024-04-10 SDOH — ECONOMIC STABILITY: INCOME INSECURITY: IN THE LAST 12 MONTHS, WAS THERE A TIME WHEN YOU WERE NOT ABLE TO PAY THE MORTGAGE OR RENT ON TIME?: NO

## 2024-04-10 SDOH — ECONOMIC STABILITY: HOUSING INSECURITY: IN THE LAST 12 MONTHS, WAS THERE A TIME WHEN YOU WERE NOT ABLE TO PAY THE MORTGAGE OR RENT ON TIME?: NO

## 2024-04-10 SDOH — ECONOMIC STABILITY: TRANSPORTATION INSECURITY

## 2024-04-10 SDOH — ECONOMIC STABILITY: HOUSING INSECURITY: IN THE LAST 12 MONTHS, HOW MANY PLACES HAVE YOU LIVED?: 1

## 2024-04-10 SDOH — ECONOMIC STABILITY: FOOD INSECURITY: WITHIN THE PAST 12 MONTHS, THE FOOD YOU BOUGHT JUST DIDN'T LAST AND YOU DIDN'T HAVE MONEY TO GET MORE.: NEVER TRUE

## 2024-04-10 SDOH — ECONOMIC STABILITY: FOOD INSECURITY: WITHIN THE PAST 12 MONTHS, THE FOOD YOU BOUGHT JUST DIDN’T LAST AND YOU DIDN’T HAVE MONEY TO GET MORE.: NEVER TRUE

## 2024-04-10 SDOH — ECONOMIC STABILITY: TRANSPORTATION INSECURITY: IN THE PAST 12 MONTHS, HAS LACK OF TRANSPORTATION KEPT YOU FROM MEDICAL APPOINTMENTS OR FROM GETTING MEDICATIONS?: NO

## 2024-04-10 SDOH — ECONOMIC STABILITY: HOUSING INSECURITY
IN THE LAST 12 MONTHS, WAS THERE A TIME WHEN YOU DID NOT HAVE A STEADY PLACE TO SLEEP OR SLEPT IN A SHELTER (INCLUDING NOW)?: NO

## 2024-04-10 SDOH — ECONOMIC STABILITY: TRANSPORTATION INSECURITY
IN THE PAST 12 MONTHS, HAS LACK OF TRANSPORTATION KEPT YOU FROM MEETINGS, WORK, OR FROM GETTING THINGS NEEDED FOR DAILY LIVING?: NO

## 2024-04-10 SDOH — ECONOMIC STABILITY: FOOD INSECURITY: WITHIN THE PAST 12 MONTHS, YOU WORRIED THAT YOUR FOOD WOULD RUN OUT BEFORE YOU GOT THE MONEY TO BUY MORE.: NEVER TRUE

## 2024-04-10 SDOH — ECONOMIC STABILITY: HOUSING INSECURITY: IN THE PAST 12 MONTHS HAS THE ELECTRIC, GAS, OIL, OR WATER COMPANY THREATENED TO SHUT OFF SERVICES IN YOUR HOME?: NO

## 2024-04-10 SDOH — ECONOMIC STABILITY: TRANSPORTATION INSECURITY
IN THE PAST 12 MONTHS, HAS THE LACK OF TRANSPORTATION KEPT YOU FROM MEDICAL APPOINTMENTS OR FROM GETTING MEDICATIONS?: NO

## 2024-04-10 SDOH — ECONOMIC STABILITY: FOOD INSECURITY

## 2024-04-10 ASSESSMENT — COGNITIVE AND FUNCTIONAL STATUS - GENERAL
DAILY ACTIVITIY SCORE: 24
MOBILITY SCORE: 24
DAILY ACTIVITIY SCORE: 24
MOBILITY SCORE: 24

## 2024-04-10 ASSESSMENT — PAIN SCALES - GENERAL
PAINLEVEL_OUTOF10: 1
PAINLEVEL_OUTOF10: 0 - NO PAIN
PAINLEVEL_OUTOF10: 3
PAINLEVEL_OUTOF10: 0 - NO PAIN
PAIN_LEVEL: 0
PAINLEVEL_OUTOF10: 0 - NO PAIN

## 2024-04-10 ASSESSMENT — PAIN - FUNCTIONAL ASSESSMENT
PAIN_FUNCTIONAL_ASSESSMENT: 0-10

## 2024-04-10 ASSESSMENT — SOCIAL DETERMINANTS OF HEALTH (SDOH): IN THE PAST 12 MONTHS, HAS THE ELECTRIC, GAS, OIL, OR WATER COMPANY THREATENED TO SHUT OFF SERVICE IN YOUR HOME?: NO

## 2024-04-10 ASSESSMENT — ENCOUNTER SYMPTOMS
ABDOMINAL DISTENTION: 1
UNEXPECTED WEIGHT CHANGE: 0
SHORTNESS OF BREATH: 0
TREMORS: 0
ENDOCRINE COMMENTS: AS ABOVE

## 2024-04-10 ASSESSMENT — PAIN DESCRIPTION - DESCRIPTORS: DESCRIPTORS: ACHING

## 2024-04-10 ASSESSMENT — ACTIVITIES OF DAILY LIVING (ADL): LACK_OF_TRANSPORTATION: NO

## 2024-04-10 NOTE — ANESTHESIA PREPROCEDURE EVALUATION
"Patient: Emiliano Walker \"Lobito\"    Procedure Information       Date/Time: 04/10/24 1130    Procedure: Umbilical Hernia Repair    Location: [facility] 08 / [facility]    Surgeons: Gene Rodriguez MD          57 yo M hx DM1 (insulin pump is at lowest basal rate; BS preop 130), obesity.    Relevant Problems   Cardiac   (+) Atherosclerosis of coronary artery   (+) Hyperlipidemia      Endocrine   (+) Type 1 diabetes mellitus (CMS/HCC)      ID   (+) Intertriginous candidiasis      Skin   (+) Atopic dermatitis       Clinical information reviewed:    Allergies  Meds               NPO Detail:  NPO/Void Status  Date of Last Liquid: 04/10/24  Time of Last Liquid: 0000  Date of Last Solid: 04/09/24  Time of Last Solid: 0730  Last Intake Type: Clear fluids         Physical Exam    Airway  Mallampati: II  TM distance: >3 FB  Neck ROM: full     Cardiovascular   Rate: normal     Dental - normal exam     Pulmonary - normal exam     Abdominal            Anesthesia Plan    History of general anesthesia?: yes  History of complications of general anesthesia?: no    ASA 2     general     intravenous induction   Postoperative administration of opioids is intended.  Anesthetic plan and risks discussed with patient.      "

## 2024-04-10 NOTE — CARE PLAN
The patient's goals for the shift include      The clinical goals for the shift include to maintain BS of 80    Over the shift, the patient did not make progress toward the following goals.   Problem: Pain - Adult  Goal: Verbalizes/displays adequate comfort level or baseline comfort level  Outcome: Progressing     Problem: Safety - Adult  Goal: Free from fall injury  Outcome: Progressing     Problem: Discharge Planning  Goal: Discharge to home or other facility with appropriate resources  Outcome: Progressing     Problem: Chronic Conditions and Co-morbidities  Goal: Patient's chronic conditions and co-morbidity symptoms are monitored and maintained or improved  Outcome: Progressing

## 2024-04-10 NOTE — H&P
"History Of Present Illness  Emiliano Walker \"Bryn" is a 58 y.o. male presenting with abdominal pain.  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    Acquired plantar porokeratosis    Benign neoplasm, unspecified site 10/10/2017    Tubular adenoma    Disorder of the skin and subcutaneous tissue, unspecified 10/05/2015    Skin lesion    Hyperlipidemia, 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/21/2022    Hyperlipidemia    Other conditions influencing health status 10/10/2017    Weight loss, intentional    Other hypertrophic disorders of the skin 10/05/2015    Skin tag    Otitis media, unspecified, right ear 03/12/2021    Right acute otitis media    Pain in left foot 10/29/2018    Left foot pain    Pain in left leg 07/13/2019    Pain of left lower extremity    Pain in unspecified wrist 12/26/2020    Wrist pain    Personal history of diseases of the skin and subcutaneous tissue 08/26/2013    History of local infection of skin and subcutaneous tissue    Personal history of other diseases of the circulatory system 12/29/2015    History of coronary atherosclerosis    Personal history of other diseases of the respiratory system 07/13/2019    History of acute bronchitis    Personal history of other specified conditions 10/10/2017    History of weight loss    Polyp of colon 10/10/2017    Benign colon polyp    Superficial mycosis, 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/03/2020    Fungal dermatitis    Type 1 diabetes mellitus without complications (CMS/[facility]) 12/19/2022    Type 1 diabetes mellitus    Unspecified acute noninfective otitis externa, right ear 04/02/2021    Acute otitis externa of right ear, unspecified type    Unspecified injury of right wrist, hand and finger(s), initial encounter 12/28/2020    Injury of right hand, initial encounter    Unspecified injury of unspecified wrist, hand and finger(s), initial encounter     Hand injury    Unspecified injury of unspecified wrist, hand and finger(s), " "initial encounter     Finger injury     Lobito suddenly developed periumbilical pain today. The pain was quite severe. Never had this before. He was not aware that he had umbilical hernia. In ED, periumbilical hernia was noted and was unable to be reduced, Surgery was consulted with plan for surgical repair. He has DM1 and has been well-controlled with insulin pump.     Past Surgical History:   Procedure Laterality Date    COLONOSCOPY  01/15/2019    Complete Colonoscopy    OTHER SURGICAL HISTORY  08/26/2013    Wrist Surgery    OTHER SURGICAL HISTORY  06/21/2022    Blepharoplasty     Social History     Tobacco Use    Smoking status: Never     Passive exposure: Never    Smokeless tobacco: Never   Vaping Use    Vaping Use: Never used   Substance Use Topics    Alcohol use: Not Currently    Drug use: Never     Family History   Problem Relation Name Age of Onset    Other (bypass) Father      Colon cancer Maternal Grandmother       Allergies  Patient has no known allergies.    Review of Systems   Constitutional:  Positive for appetite change.   HENT: Negative.     Respiratory: Negative.     Cardiovascular: Negative.    Gastrointestinal:  Positive for abdominal pain.   Genitourinary: Negative.    Musculoskeletal: Negative.    Skin: Negative.    Neurological: Negative.    Psychiatric/Behavioral: Negative.         Last Recorded Vitals  Blood pressure 120/70, pulse 61, temperature 36.5 °C (97.7 °F), temperature source Temporal, resp. rate 18, height 1.905 m (6' 3\"), weight 122 kg (270 lb), SpO2 96 %.  Physical Exam  Cardiovascular:      Rate and Rhythm: Normal rate and regular rhythm.      Heart sounds: Normal heart sounds.   Pulmonary:      Breath sounds: Normal breath sounds.   Abdominal:      General: Bowel sounds are normal.      Palpations: Abdomen is soft.      Tenderness: There is abdominal tenderness in the periumbilical area.   Musculoskeletal:         General: Normal range of motion.   Neurological:      General: No " focal deficit present.      Mental Status: He is alert and oriented to person, place, and time.   Psychiatric:         Mood and Affect: Mood normal.           Relevant Results           Assessment/Plan   Principal Problem:    Incarcerated umbilical hernia  Active Problems:    Atherosclerosis of coronary artery    Hyperlipidemia    Insulin pump status    Type 1 diabetes mellitus (CMS/[facility])  - OR tomorrow; NPO tonight  - discussed his insulin pump with endocrine and surgeon; will keep pump on and going, but have decreased target glucose         I spent 50 minutes in the professional and overall care of this patient.      Paolo Mayfield MD

## 2024-04-10 NOTE — CONSULTS
"Inpatient consult to Endocrinology  Consult performed by: Tyrell Bates MD  Consult ordered by: Paolo Mayfield MD      Reason For Consult  Diabetes    History Of Present Illness  Emiliano Walker \"Lobito\" is a 58 y.o. male admitted yesterday with incarcerated abdominal hernia.     Duration of type 1 diabetes mellitus:  37 years  Complications:  none     Insulin pump status  Insulin:  Novolog     Manual basal 33.6 unit/day  1.4 unit/h     Bolus  MN-02:30 6.5  2:30-16:30 5  16:30-MN 6.5      Insulin sensitivity factor:  30  Target glucose 110  Active insulin time 5h     Temp target 150 mg/dl during busy work     DexCom G6  Patient is testing glucose 288 times daily      Medications    Current Facility-Administered Medications:     acetaminophen (Tylenol) tablet 650 mg, 650 mg, oral, q4h PRN **OR** acetaminophen (Tylenol) oral liquid 650 mg, 650 mg, oral, q4h PRN **OR** acetaminophen (Tylenol) suppository 650 mg, 650 mg, rectal, q4h PRN, VIRY Mckeon    atorvastatin (Lipitor) tablet 20 mg, 20 mg, oral, Nightly, EDITH Mckeon-CNP, 20 mg at 04/09/24 2155    dextrose 50 % injection 12.5 g, 12.5 g, intravenous, q15 min PRN, Paolo Mayfield MD    dextrose 50 % injection 25 g, 25 g, intravenous, q15 min PRN, Paolo Mayfield MD, 25 g at 04/10/24 0002    [Held by provider] enoxaparin (Lovenox) syringe 40 mg, 40 mg, subcutaneous, q24h, VIRY Mckeon    glucagon (Glucagen) injection 1 mg, 1 mg, intramuscular, q15 min PRN, Paolo Mayfield MD    glucagon (Glucagen) injection 1 mg, 1 mg, intramuscular, q15 min PRN, Paolo Mayfield MD    insulin lispro (HumaLOG) refill for patient own pump 3 mL, 3 mL, pump - subcutaneous, PRN, Paolo Mayfield MD    INSULIN PUMP- PATIENT SUPPLIED, , pump - subcutaneous, Continuous Pump, Paolo Mayfield MD    morphine injection 2 mg, 2 mg, intravenous, q4h PRN, Alicia Cortez, APRN-CNP    ondansetron (Zofran) tablet 4 mg, 4 " mg, oral, q8h PRN **OR** ondansetron (Zofran) injection 4 mg, 4 mg, intravenous, q8h PRN, VIRY Mckeon    pantoprazole (ProtoNix) EC tablet 40 mg, 40 mg, oral, Daily before breakfast **OR** pantoprazole (ProtoNix) injection 40 mg, 40 mg, intravenous, Daily before breakfast, VIRY Mckeon    polyethylene glycol (Glycolax, Miralax) packet 17 g, 17 g, oral, Daily, EDITH Mckeon-CNP, 17 g at 04/09/24 1635    [Held by provider] sodium chloride 0.9% infusion, 75 mL/hr, intravenous, Continuous, VIRY Mckeon, Last Rate: 75 mL/hr at 04/10/24 0418, 75 mL/hr at 04/10/24 0418     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), Benign neoplasm, unspecified site (10/10/2017), Disorder of the skin and subcutaneous tissue, unspecified (10/05/2015), Hyperlipidemia, unspecified (06/21/2022), Other conditions influencing health status (10/10/2017), Other hypertrophic disorders of the skin (10/05/2015), Otitis media, unspecified, right ear (03/12/2021), Pain in left foot (10/29/2018), Pain in left leg (07/13/2019), Pain in unspecified wrist (12/26/2020), Personal history of diseases of the skin and subcutaneous tissue (08/26/2013), Personal history of other diseases of the circulatory system (12/29/2015), Personal history of other diseases of the respiratory system (07/13/2019), Personal history of other specified conditions (10/10/2017), Polyp of colon (10/10/2017), Superficial mycosis, unspecified (06/03/2020), Type 1 diabetes mellitus without complications (CMS/HCC) (12/19/2022), Unspecified acute noninfective otitis externa, right ear (04/02/2021), Unspecified injury of right wrist, hand and finger(s), initial encounter (12/28/2020), Unspecified injury of unspecified wrist, hand and finger(s), initial encounter, and Unspecified injury of unspecified wrist, hand and finger(s), initial  "encounter.    Surgical History  He has a past surgical history that includes Other surgical history (08/26/2013); Colonoscopy (01/15/2019); and Other surgical history (06/21/2022).     Social History  He reports that he has never smoked. He has never been exposed to tobacco smoke. He has never used smokeless tobacco. He reports that he does not currently use alcohol. He reports that he does not use drugs.    Family History  Family History   Problem Relation Name Age of Onset    Other (bypass) Father      Colon cancer Maternal Grandmother         Allergies  Patient has no known allergies.    Review of Systems   Constitutional:  Negative for unexpected weight change.   Respiratory:  Negative for shortness of breath.    Cardiovascular:  Negative for chest pain.   Gastrointestinal:  Positive for abdominal distention.   Endocrine:        As above   Neurological:  Negative for tremors.         Last Recorded Vitals  Blood pressure 118/70, pulse 68, temperature 35.9 °C (96.6 °F), temperature source Oral, resp. rate 18, height 1.905 m (6' 3\"), weight 122 kg (270 lb), SpO2 94%.    Physical Exam  Constitutional:       General: He is not in acute distress.  HENT:      Head: Normocephalic.      Mouth/Throat:      Mouth: Mucous membranes are moist.   Eyes:      Extraocular Movements: Extraocular movements intact.   Neck:      Thyroid: No thyromegaly.   Cardiovascular:      Pulses:           Radial pulses are 2+ on the right side and 2+ on the left side.   Abdominal:      Tenderness: There is no abdominal tenderness.   Neurological:      Mental Status: He is alert.      Motor: No tremor.   Psychiatric:         Mood and Affect: Affect normal.          Relevant Results  Lab Results   Component Value Date    GLUCOSE 113 (H) 04/09/2024    GLUCOSE 170 (H) 06/05/2023    GLUCOSE 156 (H) 08/18/2022    GLUCOSE 93 02/05/2022    GLUCOSE 118 (H) 01/09/2021      Latest Reference Range & Units 04/09/24 11:01   GLUCOSE 74 - 99 mg/dL 113 (H) " "  SODIUM 136 - 145 mmol/L 137   POTASSIUM 3.5 - 5.3 mmol/L 4.0   CHLORIDE 98 - 107 mmol/L 103   Bicarbonate 21 - 32 mmol/L 27   Anion Gap 10 - 20 mmol/L 11   Blood Urea Nitrogen 6 - 23 mg/dL 11   Creatinine 0.50 - 1.30 mg/dL 0.84   EGFR >60 mL/min/1.73m*2 >90   Calcium 8.6 - 10.3 mg/dL 9.2   Albumin 3.4 - 5.0 g/dL 4.4   Alkaline Phosphatase 33 - 120 U/L 74   ALT 10 - 52 U/L 24   AST 9 - 39 U/L 27   Bilirubin Total 0.0 - 1.2 mg/dL 0.9         IMPRESSION  TYPE 1 DIABETES MELLITUS  INSULIN PUMP STATUS  Patient will continue to require continuous insulin even in the perioperative period, due to type 1 diabetes and risk of ketosis      RECOMMENDATIONS  Favor continuing insulin pump with automated basal in the perioperative period  Decrease manual basal to 1 unit/h  Resume \"activity\" target glucose 150 mg/dl in the perioperative period          Tyrell Bates MD    "

## 2024-04-10 NOTE — ANESTHESIA POSTPROCEDURE EVALUATION
"Patient: Emiliano Walker \"Lobito\"    Procedure Summary       Date: 04/10/24 Room / Location: [facility] OR 08 / Virtual [facility] OR    Anesthesia Start: 1327 Anesthesia Stop: 1512    Procedure: Umbilical Hernia Repair Diagnosis:       Umbilical hernia without obstruction and without gangrene      (Umbilical hernia without obstruction and without gangrene [K42.9])    Surgeons: Gene Rodriguez MD Responsible Provider: Jose David Crowe MD    Anesthesia Type: general ASA Status: 2            Anesthesia Type: general    Vitals Value Taken Time   /62 04/10/24 1537   Temp 36.2 °C (97.2 °F) 04/10/24 1537   Pulse 80 04/10/24 1537   Resp 15 04/10/24 1537   SpO2 98 % 04/10/24 1537       Anesthesia Post Evaluation    Patient location during evaluation: PACU  Patient participation: complete - patient participated  Level of consciousness: awake  Pain management: adequate  Airway patency: patent  Cardiovascular status: acceptable  Respiratory status: acceptable  Hydration status: acceptable  Postoperative Nausea and Vomiting: none        No notable events documented.    "

## 2024-04-10 NOTE — ANESTHESIA POSTPROCEDURE EVALUATION
"Patient: Emiliano Walker \"Lobito\"    Procedure Summary       Date: 04/10/24 Room / Location: [facility] OR 08 / Virtual [facility] OR    Anesthesia Start: 1327 Anesthesia Stop: 1512    Procedure: Umbilical Hernia Repair Diagnosis:       Umbilical hernia without obstruction and without gangrene      (Umbilical hernia without obstruction and without gangrene [K42.9])    Surgeons: Gene Rodriguez MD Responsible Provider: Jose David Crowe MD    Anesthesia Type: general ASA Status: 2            Anesthesia Type: general    Vitals Value Taken Time   BP 93/58 04/10/24 1516   Temp 36.1 °C (97 °F) 04/10/24 1510   Pulse 83 04/10/24 1516   Resp 16 04/10/24 1510   SpO2 98 % 04/10/24 1516   Vitals shown include unfiled device data.    Anesthesia Post Evaluation    Patient location during evaluation: PACU  Patient participation: waiting for patient participation  Level of consciousness: awake  Pain score: 0  Pain management: adequate  Multimodal analgesia pain management approach  Airway patency: patent  Cardiovascular status: acceptable and stable  Respiratory status: acceptable, face mask and oral airway  Hydration status: acceptable  Postoperative Nausea and Vomiting: none        No notable events documented.    "

## 2024-04-10 NOTE — PERIOPERATIVE NURSING NOTE
1507: Phase 1 care begins  1510: blood sugar 135 off insulin pump  1518: Pt family updated via message  1520: Report sent to  RN, all questions answered at this time

## 2024-04-10 NOTE — DISCHARGE SUMMARY
"Admitting Provider: Paolo Mayfield MD  Discharge Provider: Paolo Mayfield MD  Primary Care Physician at Discharge: Marina WEISS Shanikaewa, APRN--[phone]   Admission Date: 4/9/2024     Discharge Date: 4/10/2024  Current Planned Discharge Disposition: Home    Discharge Diagnoses  Principal Problem:    Incarcerated umbilical hernia  Active Problems:    Atherosclerosis of coronary artery    Hyperlipidemia    Insulin pump status    Type 1 diabetes mellitus (CMS/[facility])      Hospital Course  58 yoM with acute abdominal pain.    He was found to have incarcerated umbilical hernia. He went for open hernia repair the next day. Endocrine followed along to manage her insulin pump. Discharged home in stable condition.     Test Results Pending At Discharge  Pending Labs       Order Current Status    Extra Urine Gray Tube Collected (04/09/24 1101)    Surgical Pathology Exam Collected (04/10/24 1410)    Urinalysis with Reflex Culture and Microscopic In process            Pertinent Physical Exam At Time of Discharge  /67   Pulse 59   Temp 36.8 °C (98.2 °F) (Temporal)   Resp 16   Ht 1.905 m (6' 3\")   Wt 122 kg (270 lb)   SpO2 94%   BMI 33.75 kg/m²   Physical Exam  Cardiovascular:      Rate and Rhythm: Normal rate and regular rhythm.      Heart sounds: Normal heart sounds.   Pulmonary:      Breath sounds: Normal breath sounds.   Abdominal:      General: Bowel sounds are normal.      Palpations: Abdomen is soft.   Musculoskeletal:         General: Normal range of motion.   Neurological:      General: No focal deficit present.      Mental Status: He is alert and oriented to person, place, and time.   Psychiatric:         Mood and Affect: Mood normal.         Home Medications     Medication List      START taking these medications     acetaminophen 325 mg tablet; Commonly known as: Tylenol; Take 2 tablets   (650 mg) by mouth every 6 hours if needed for mild pain (1 - 3) for up to   20 doses.   ibuprofen 600 mg tablet; Take " 1 tablet (600 mg) by mouth every 6 hours   if needed for moderate pain (4 - 6) for up to 20 doses.   oxyCODONE 5 mg immediate release tablet; Commonly known as: Roxicodone;   Take 1 tablet (5 mg) by mouth every 6 hours if needed for severe pain (7 -   10) for up to 12 doses.   polyethylene glycol 17 gram/dose powder; Commonly known as: Glycolax,   Miralax; Mix 17 grams in liquid and drink by mouth once daily as needed   (for constipation) for up to 10 doses.     CHANGE how you take these medications     insulin lispro 100 unit/mL injection; Commonly known as: HumaLOG U-100   Insulin; Continuous subcutaneous insulin pump infusion, 120 units per   day.; What changed: additional instructions     CONTINUE taking these medications     ascorbic acid 250 mg tablet; Commonly known as: Vitamin C   aspirin 81 mg EC tablet   atorvastatin 20 mg tablet; Commonly known as: Lipitor; TAKE 1 TABLET BY   MOUTH ONE TIME DAILY   Daily Multi-Vitamin tablet; Generic drug: multivitamin   Dexcom G6 Transmitter device; Generic drug: blood-glucose transmitter   device   sildenafil 100 mg tablet; Commonly known as: Viagra; TAKE 1 TABLET BY   MOUTH ONE TIME DAILY AS NEEDED   Vitamin D3 50 MCG (2000 UT) tablet; Generic drug: cholecalciferol       Outpatient Follow-Up  Future Appointments   Date Time Provider Department Center   6/24/2024  4:00 PM Tyrell Bates MD [id number] [facility]   6/25/2024  3:40 PM EDITH Barksdale-CNP [id number] [facility]       Paolo Mayfield MD    I spent more than 30 min coordinating this patient's discharge.

## 2024-04-10 NOTE — NURSING NOTE
Patient BS did drop last night around 12:15am to 67. Patient was given dextrose 50% 25g. Patient was then rechecked in 15mins 76, rechecked again 30 mins BS went to 105. Rechecked patient again at 2:20am BS was 89. Rechecked again this morning at 6:30 patient BS is 150. MD was informed.

## 2024-04-10 NOTE — PROGRESS NOTES
04/10/24 1027   Discharge Planning   Patient expects to be discharged to: Home     Patient waiting for surgery to take him for hernia repair.  Endocrinology is also following as patient is a type 1 diabetic and on an insulin pump.  Plan remains for patient to return home upon discharge. Will continue to follow for discharge planning needs.

## 2024-04-10 NOTE — ANESTHESIA PROCEDURE NOTES
Airway  Date/Time: 4/10/2024 1:44 PM  Urgency: elective    Airway not difficult    Staffing  Performed: CRNA   Authorized by: Jose David Crowe MD    Performed by: EDITH Iraheta-COLLIN  Patient location during procedure: OR    Indications and Patient Condition  Indications for airway management: anesthesia  Spontaneous ventilation: present  Sedation level: deep  Preoxygenated: yes  Patient position: sniffing  MILS maintained throughout  Mask difficulty assessment: 2 - vent by mask + OA or adjuvant +/- NMBA    Final Airway Details  Final airway type: endotracheal airway      Successful airway: ETT  Cuffed: yes   Successful intubation technique: direct laryngoscopy  Endotracheal tube insertion site: oral  Blade: Jesse  Blade size: #4  ETT size (mm): 8.0  Cormack-Lehane Classification: grade IIa - partial view of glottis  Placement verified by: capnometry   Measured from: lips  ETT to lips (cm): 21  Number of attempts at approach: 1

## 2024-04-10 NOTE — OP NOTE
"Umbilical Hernia Repair Operative Note     Date: 2024 - 4/10/2024  OR Location: [facility] A OR    Name: Emiliano Walker \"Bryn", : 1965, Age: 58 y.o., MRN: [id number], Sex: male    Diagnosis  Pre-op Diagnosis     * Umbilical hernia without obstruction and without gangrene [K42.9] Post-op Diagnosis     * Umbilical hernia without obstruction and without gangrene [K42.9]     Procedures  Open umbilical hernia repair without mesh, 2 cm in size    Surgeons      * Gene Rodriguez - Primary    Resident/Fellow/Other Assistant:  Surgeons and Role:  * No surgeons found with a matching role *    Procedure Summary  Anesthesia: General  ASA: II  Anesthesia Staff: Anesthesiologist: Jose David Crowe MD  CRNA: EDITH Iraheta-CRNA  Estimated Blood Loss: 10 mL  Intra-op Medications: Administrations occurring from 1130 to 1300 on 04/10/24:  * No intraprocedure medications in log *           Anesthesia Record               Intraprocedure I/O Totals          Output    Est. Blood Loss 25 mL    Total Output 25 mL          Specimen:   ID Type Source Tests Collected by Time   1 : INCARCERATED OMENTUM Tissue OMENTUM RESECTION SURGICAL PATHOLOGY EXAM Gene Rodriguez MD 4/10/2024 1410        Staff:   Circulator: Satya Rose RN  Scrub Person: Megha Ramsey; Milla Porras; Akash Melchor         Drains and/or Catheters: * None in log *    Tourniquet Times:         Implants:     Findings: Incarcerated fat-containing umbilical hernia with large and thickened hernia sac    Indications: Lobito Walker is an 58 y.o. male who is having surgery for Umbilical hernia without obstruction and without gangrene [K42.9].  Presented emergency department with acute abdominal pain.  CT with fat-containing incarcerated umbilical hernia.    The patient was seen in the preoperative area. The risks, benefits, complications, treatment options, non-operative alternatives, expected recovery and outcomes were discussed with the " patient. The possibilities of reaction to medication, pulmonary aspiration, injury to surrounding structures, bleeding, recurrent infection, the need for additional procedures, failure to diagnose a condition, and creating a complication requiring transfusion or operation were discussed with the patient. The patient concurred with the proposed plan, giving informed consent.  The site of surgery was properly noted/marked if necessary per policy. The patient has been actively warmed in preoperative area. Preoperative antibiotics have been ordered and given within 1 hours of incision. Venous thrombosis prophylaxis have been ordered including bilateral sequential compression devices    Procedure Details:   Patient was identified in the preoperative area and transported to the operating room.  They were placed supine on the OR table.  General anesthesia was induced.  The abdomen was clipped, prepped, and draped in usual sterile fashion.  Time-out was performed confirming all relevant perioperative criteria.  Periumbilical skin was infiltrated 1% lidocaine 0.25% Marcaine using a total of 20 cubic centimeters.  A 3 centimeter curvilinear incision was made in the supraumbilical skin.  This was carried down to the fascia with electrocautery.  The hernia sac was peeled from the umbilical skin and opened under direct visualization.  The hernia sac was fairly thickened and enlarged.  This was excised and sent as specimen.  There was incarcerated fat which was also excised and sent.  Some omental adhesions were taken down from the peritoneum. The fascial edges were cleared circumferentially and final hernia defect measured approximately 2 centimeters in size. Fascia was closed with interrupted 0 PDS in a figure-of-eight fashion. Subcutaneous tissues were irrigated.  Subcutaneous space was closed with 3 0 Vicryl.  Umbilical dermis was tacked to the fascia with 0 Vicryl.  Skin was closed with 4 Monocryl in subcuticular fashion  followed by skin glue.  A pressure dressing was applied after the glue was dry followed by an abdominal binder.  Prior to completion of case all counts were for be correct.  Patient was then extubated and transferred the PACU in stable condition.   Complications:  None; patient tolerated the procedure well.    Disposition: PACU - hemodynamically stable.  Condition: stable         Additional Details:     Attending Attestation: I was present and scrubbed for the entire procedure.    Gene Rodriguez  Phone Number: [phone]

## 2024-04-10 NOTE — SIGNIFICANT EVENT
SpO2 95-97% RA. Educated on continuing IS even at home, verbalized understanding. Tolerated dinner without difficulty, reports feeling okay. OK for DC

## 2024-04-11 ENCOUNTER — PATIENT OUTREACH (OUTPATIENT)
Dept: PRIMARY CARE | Facility: CLINIC | Age: 59
End: 2024-04-11
Payer: COMMERCIAL

## 2024-04-11 NOTE — PROGRESS NOTES
[facility] member [facility] [place] outreach.    Spoke with member I introduced myself and purpose of call.  Confirmed :   No new or worsening symptoms.  Member has Rx given at discharge.  Denies Pain, only some soreness. Denies F/N or signes of infection.  Endorses increased fluid intake. Explained importance of hydration.  Has been checking BS since being home. Encouraged outreach to endocrinology see if there is anything different they may want him to do following surgery. Encouraged outreach to PCP.  Aware he will be receiving Conversa.   No questions or concerns at this time. Available if needed in future.

## 2024-04-17 LAB
LABORATORY COMMENT REPORT: NORMAL
PATH REPORT.FINAL DX SPEC: NORMAL
PATH REPORT.GROSS SPEC: NORMAL
PATH REPORT.RELEVANT HX SPEC: NORMAL
PATH REPORT.TOTAL CANCER: NORMAL

## 2024-04-24 NOTE — PROGRESS NOTES
[facility]: GENERAL SURGERY  PROGRESS NOTE      Emiliano Walker is a 58 y.o. male that is presenting today for No chief complaint on file..    ASSESSMENT / PLAN:  Diagnoses and all orders for this visit:  Incarcerated umbilical hernia  Healing as expected, ongoing lifting restrictions for total of 4 weeks after surgery, follow-up as needed  Patient Active Problem List   Diagnosis    Annual physical exam    Atherosclerosis of coronary artery    Atopic dermatitis    Benign colon polyp    Erectile dysfunction    Hyperlipidemia    Insulin pump status    Intertriginous candidiasis    Tubular adenoma    Type 1 diabetes mellitus (Multi)    Incarcerated umbilical hernia     Subjective   Emiliano Walker 58 yr old Male here admitted for incarcerated umbilical hernia underwent surgery on 4/10/2024.    Open umbilical hernia repair without mesh, 2 cm in size    Surgical pathology  FINAL DIAGNOSIS     A. OMENTUM RESECTION:  --  Mature adipose tissue with congestion.     No acute issues    Review of Systems   Constitutional: Negative.    HENT: Negative.     Eyes: Negative.    Respiratory: Negative.     Cardiovascular: Negative.    Gastrointestinal: Negative.    Genitourinary: Negative.    Skin: Negative.    All other systems reviewed and are negative.     Objective   There were no vitals filed for this visit.   Physical Exam  Constitutional:       Appearance: Normal appearance.   HENT:      Head: Normocephalic.   Eyes:      Pupils: Pupils are equal, round, and reactive to light.   Cardiovascular:      Rate and Rhythm: Normal rate.   Pulmonary:      Effort: Pulmonary effort is normal.   Abdominal:      General: Abdomen is flat. Bowel sounds are normal.      Palpations: Abdomen is soft.      Comments: Incision clean dry and intact   Skin:     General: Skin is warm.   Neurological:      General: No focal deficit present.      Mental Status: He is alert.         Diagnostic Results   Lab Results   Component Value Date     "GLUCOSE 116 (H) 04/10/2024    CALCIUM 8.7 04/10/2024     04/10/2024    K 3.9 04/10/2024    CO2 24 04/10/2024     04/10/2024    BUN 10 04/10/2024    CREATININE 0.67 04/10/2024     Lab Results   Component Value Date    ALT 22 04/10/2024    AST 25 04/10/2024    ALKPHOS 77 04/10/2024    BILITOT 1.2 04/10/2024     Lab Results   Component Value Date    WBC 7.8 04/10/2024    HGB 14.6 04/10/2024    HCT 42.8 04/10/2024    MCV 93 04/10/2024     04/10/2024     Lab Results   Component Value Date    CHOL 123 06/05/2023    CHOL 127 08/18/2022    CHOL 124 02/05/2022     Lab Results   Component Value Date    HDL 45.3 06/05/2023    HDL 45.6 08/18/2022    HDL 54.0 02/05/2022     No results found for: \"LDLCALC\"  Lab Results   Component Value Date    TRIG 76 06/05/2023    TRIG 60 08/18/2022    TRIG 40 02/05/2022     No components found for: \"CHOLHDL\"  Lab Results   Component Value Date    HGBA1C 6.8 (A) 12/18/2023     Other labs not included in the list above were reviewed either before or during this encounter.    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    Acquired plantar porokeratosis    Benign neoplasm, unspecified site 10/10/2017    Tubular adenoma    Disorder of the skin and subcutaneous tissue, unspecified 10/05/2015    Skin lesion    Hyperlipidemia, 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/21/2022    Hyperlipidemia    Other conditions influencing health status 10/10/2017    Weight loss, intentional    Other hypertrophic disorders of the skin 10/05/2015    Skin tag    Otitis media, unspecified, right ear 03/12/2021    Right acute otitis media    Pain in left foot 10/29/2018    Left foot pain    Pain in left leg 07/13/2019    Pain of left lower extremity    Pain in unspecified wrist 12/26/2020    Wrist pain    Personal history of diseases of the skin and subcutaneous tissue 08/26/2013    History of local infection of skin and subcutaneous tissue    Personal " history of other diseases of the circulatory system 12/29/2015    History of coronary atherosclerosis    Personal history of other diseases of the respiratory system 07/13/2019    History of acute bronchitis    Personal history of other specified conditions 10/10/2017    History of weight loss    Polyp of colon 10/10/2017    Benign colon polyp    Superficial mycosis, 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/03/2020    Fungal dermatitis    Type 1 diabetes mellitus without complications (Multi) 12/19/2022    Type 1 diabetes mellitus    Unspecified acute noninfective otitis externa, right ear 04/02/2021    Acute otitis externa of right ear, unspecified type    Unspecified injury of right wrist, hand and finger(s), initial encounter 12/28/2020    Injury of right hand, initial encounter    Unspecified injury of unspecified wrist, hand and finger(s), initial encounter     Hand injury    Unspecified injury of unspecified wrist, hand and finger(s), initial encounter     Finger injury     Past Surgical History:   Procedure Laterality Date    COLONOSCOPY  01/15/2019    Complete Colonoscopy    OTHER SURGICAL HISTORY  08/26/2013    Wrist Surgery    OTHER SURGICAL HISTORY  06/21/2022    Blepharoplasty     Family History   Problem Relation Name Age of Onset    Other (bypass) Father      Colon cancer Maternal Grandmother       No Known Allergies  Current Outpatient Medications on File Prior to Visit   Medication Sig Dispense Refill    acetaminophen (Tylenol) 325 mg tablet Take 2 tablets (650 mg) by mouth every 6 hours if needed for mild pain (1 - 3) for up to 20 doses. 20 tablet 0    ascorbic acid (Vitamin C) 250 mg tablet Take 1 tablet (250 mg) by mouth once daily.      aspirin 81 mg EC tablet Take 1 tablet (81 mg) by mouth once daily.      atorvastatin (Lipitor) 20 mg tablet TAKE 1 TABLET BY MOUTH ONE TIME DAILY 90 tablet 3    cholecalciferol (Vitamin D3) 50 MCG (2000 UT) tablet Take 1 tablet (50 mcg) by mouth once daily.      Dexcom G6 Transmitter  device       ibuprofen 600 mg tablet Take 1 tablet (600 mg) by mouth every 6 hours if needed for moderate pain (4 - 6) for up to 20 doses. 20 tablet 0    insulin lispro (HumaLOG U-100 Insulin) 100 unit/mL injection Continuous subcutaneous insulin pump infusion, 120 units per day. (Patient taking differently: Continuous subcutaneous insulin pump infusion:  Basal rate 1.4 units/hr  Insulin to carb ratio  MN - 1430:    1:6.5  1430- 1630:   1:5  1630 - MN:    1:6.5  Insulin sensitivity factor 30  Last site exchange 4/8) 120 mL 3    multivitamin (Daily Multi-Vitamin) tablet Take 1 tablet by mouth once daily.      oxyCODONE (Roxicodone) 5 mg immediate release tablet Take 1 tablet (5 mg) by mouth every 6 hours if needed for severe pain (7 - 10) for up to 12 doses. 12 tablet 0    polyethylene glycol (Glycolax, Miralax) 17 gram/dose powder Mix 17 grams in liquid and drink by mouth once daily as needed (for constipation) for up to 10 doses. 238 g 0    sildenafil (Viagra) 100 mg tablet TAKE 1 TABLET BY MOUTH ONE TIME DAILY AS NEEDED 10 tablet 3     No current facility-administered medications on file prior to visit.     Immunization History   Administered Date(s) Administered    Pfizer COVID-19 vaccine, bivalent, age 12 years and older (30 mcg/0.3 mL) 11/11/2022    Pfizer Purple Cap SARS-CoV-2 03/16/2021, 04/15/2021, 10/16/2021     Patient's medical history was reviewed and updated either before or during this encounter.    Gene Rodriguez MD

## 2024-04-26 ENCOUNTER — OFFICE VISIT (OUTPATIENT)
Dept: SURGERY | Facility: CLINIC | Age: 59
End: 2024-04-26
Payer: COMMERCIAL

## 2024-04-26 VITALS
BODY MASS INDEX: 33.9 KG/M2 | TEMPERATURE: 98.1 F | HEART RATE: 70 BPM | DIASTOLIC BLOOD PRESSURE: 77 MMHG | HEIGHT: 76 IN | WEIGHT: 278.4 LBS | SYSTOLIC BLOOD PRESSURE: 152 MMHG

## 2024-04-26 DIAGNOSIS — K42.0 INCARCERATED UMBILICAL HERNIA: Primary | ICD-10-CM

## 2024-04-26 PROCEDURE — 99024 POSTOP FOLLOW-UP VISIT: CPT | Performed by: SURGERY

## 2024-04-26 PROCEDURE — 3078F DIAST BP <80 MM HG: CPT | Performed by: SURGERY

## 2024-04-26 PROCEDURE — 1036F TOBACCO NON-USER: CPT | Performed by: SURGERY

## 2024-04-26 PROCEDURE — 3008F BODY MASS INDEX DOCD: CPT | Performed by: SURGERY

## 2024-04-26 PROCEDURE — 3077F SYST BP >= 140 MM HG: CPT | Performed by: SURGERY

## 2024-04-26 RX ORDER — BLOOD-GLUCOSE SENSOR
EACH MISCELLANEOUS
COMMUNITY
Start: 2024-04-25

## 2024-04-26 ASSESSMENT — PAIN SCALES - GENERAL: PAINLEVEL: 2

## 2024-04-26 ASSESSMENT — PATIENT HEALTH QUESTIONNAIRE - PHQ9
SUM OF ALL RESPONSES TO PHQ9 QUESTIONS 1 & 2: 0
2. FEELING DOWN, DEPRESSED OR HOPELESS: NOT AT ALL
1. LITTLE INTEREST OR PLEASURE IN DOING THINGS: NOT AT ALL

## 2024-04-26 ASSESSMENT — ENCOUNTER SYMPTOMS
LOSS OF SENSATION IN FEET: 0
OCCASIONAL FEELINGS OF UNSTEADINESS: 0
CARDIOVASCULAR NEGATIVE: 1
CONSTITUTIONAL NEGATIVE: 1
GASTROINTESTINAL NEGATIVE: 1
DEPRESSION: 0
RESPIRATORY NEGATIVE: 1
EYES NEGATIVE: 1

## 2024-04-26 ASSESSMENT — COLUMBIA-SUICIDE SEVERITY RATING SCALE - C-SSRS
1. IN THE PAST MONTH, HAVE YOU WISHED YOU WERE DEAD OR WISHED YOU COULD GO TO SLEEP AND NOT WAKE UP?: NO
6. HAVE YOU EVER DONE ANYTHING, STARTED TO DO ANYTHING, OR PREPARED TO DO ANYTHING TO END YOUR LIFE?: NO
2. HAVE YOU ACTUALLY HAD ANY THOUGHTS OF KILLING YOURSELF?: NO

## 2024-05-01 ENCOUNTER — PHARMACY VISIT (OUTPATIENT)
Dept: PHARMACY | Facility: CLINIC | Age: 59
End: 2024-05-01
Payer: COMMERCIAL

## 2024-05-01 PROCEDURE — RXMED WILLOW AMBULATORY MEDICATION CHARGE

## 2024-06-03 ENCOUNTER — PHARMACY VISIT (OUTPATIENT)
Dept: PHARMACY | Facility: CLINIC | Age: 59
End: 2024-06-03

## 2024-06-03 ENCOUNTER — LAB (OUTPATIENT)
Dept: LAB | Facility: LAB | Age: 59
End: 2024-06-03
Payer: COMMERCIAL

## 2024-06-03 DIAGNOSIS — E10.9 TYPE 1 DIABETES MELLITUS WITHOUT COMPLICATION (MULTI): ICD-10-CM

## 2024-06-03 LAB
ALBUMIN SERPL BCP-MCNC: 4.2 G/DL (ref 3.4–5)
ALP SERPL-CCNC: 83 U/L (ref 33–120)
ALT SERPL W P-5'-P-CCNC: 19 U/L (ref 10–52)
ANION GAP SERPL CALC-SCNC: 11 MMOL/L (ref 10–20)
AST SERPL W P-5'-P-CCNC: 17 U/L (ref 9–39)
BILIRUB SERPL-MCNC: 0.7 MG/DL (ref 0–1.2)
BUN SERPL-MCNC: 12 MG/DL (ref 6–23)
CALCIUM SERPL-MCNC: 9.3 MG/DL (ref 8.6–10.3)
CHLORIDE SERPL-SCNC: 103 MMOL/L (ref 98–107)
CHOLEST SERPL-MCNC: 138 MG/DL (ref 0–199)
CHOLESTEROL/HDL RATIO: 2.8
CO2 SERPL-SCNC: 27 MMOL/L (ref 21–32)
CREAT SERPL-MCNC: 0.87 MG/DL (ref 0.5–1.3)
EGFRCR SERPLBLD CKD-EPI 2021: >90 ML/MIN/1.73M*2
ERYTHROCYTE [DISTWIDTH] IN BLOOD BY AUTOMATED COUNT: 12.7 % (ref 11.5–14.5)
GLUCOSE SERPL-MCNC: 137 MG/DL (ref 74–99)
HCT VFR BLD AUTO: 44 % (ref 41–52)
HDLC SERPL-MCNC: 48.8 MG/DL
HGB BLD-MCNC: 14.6 G/DL (ref 13.5–17.5)
LDLC SERPL CALC-MCNC: 78 MG/DL
MCH RBC QN AUTO: 30.8 PG (ref 26–34)
MCHC RBC AUTO-ENTMCNC: 33.2 G/DL (ref 32–36)
MCV RBC AUTO: 93 FL (ref 80–100)
NON HDL CHOLESTEROL: 89 MG/DL (ref 0–149)
NRBC BLD-RTO: 0 /100 WBCS (ref 0–0)
PLATELET # BLD AUTO: 233 X10*3/UL (ref 150–450)
POTASSIUM SERPL-SCNC: 4.1 MMOL/L (ref 3.5–5.3)
PROT SERPL-MCNC: 6.2 G/DL (ref 6.4–8.2)
RBC # BLD AUTO: 4.74 X10*6/UL (ref 4.5–5.9)
SODIUM SERPL-SCNC: 137 MMOL/L (ref 136–145)
TRIGL SERPL-MCNC: 58 MG/DL (ref 0–149)
TSH SERPL-ACNC: 1.75 MIU/L (ref 0.44–3.98)
VLDL: 12 MG/DL (ref 0–40)
WBC # BLD AUTO: 6.2 X10*3/UL (ref 4.4–11.3)

## 2024-06-03 PROCEDURE — 82043 UR ALBUMIN QUANTITATIVE: CPT

## 2024-06-03 PROCEDURE — 36415 COLL VENOUS BLD VENIPUNCTURE: CPT

## 2024-06-03 PROCEDURE — 84443 ASSAY THYROID STIM HORMONE: CPT

## 2024-06-03 PROCEDURE — 80053 COMPREHEN METABOLIC PANEL: CPT

## 2024-06-03 PROCEDURE — RXMED WILLOW AMBULATORY MEDICATION CHARGE

## 2024-06-03 PROCEDURE — 85027 COMPLETE CBC AUTOMATED: CPT

## 2024-06-03 PROCEDURE — 80061 LIPID PANEL: CPT

## 2024-06-03 PROCEDURE — 83036 HEMOGLOBIN GLYCOSYLATED A1C: CPT

## 2024-06-03 PROCEDURE — 82570 ASSAY OF URINE CREATININE: CPT

## 2024-06-04 LAB
CREAT UR-MCNC: 62.8 MG/DL (ref 20–370)
EST. AVERAGE GLUCOSE BLD GHB EST-MCNC: 131 MG/DL
HBA1C MFR BLD: 6.2 %
MICROALBUMIN UR-MCNC: <7 MG/L
MICROALBUMIN/CREAT UR: NORMAL MG/G{CREAT}

## 2024-06-24 ENCOUNTER — APPOINTMENT (OUTPATIENT)
Dept: ENDOCRINOLOGY | Facility: CLINIC | Age: 59
End: 2024-06-24
Payer: COMMERCIAL

## 2024-06-24 ENCOUNTER — PHARMACY VISIT (OUTPATIENT)
Dept: PHARMACY | Facility: CLINIC | Age: 59
End: 2024-06-24
Payer: COMMERCIAL

## 2024-06-24 VITALS
DIASTOLIC BLOOD PRESSURE: 74 MMHG | BODY MASS INDEX: 34.69 KG/M2 | SYSTOLIC BLOOD PRESSURE: 125 MMHG | WEIGHT: 285 LBS | HEART RATE: 69 BPM

## 2024-06-24 DIAGNOSIS — Z96.41 INSULIN PUMP STATUS: ICD-10-CM

## 2024-06-24 DIAGNOSIS — E10.9 TYPE 1 DIABETES MELLITUS WITHOUT COMPLICATION (MULTI): Primary | ICD-10-CM

## 2024-06-24 PROCEDURE — 1036F TOBACCO NON-USER: CPT | Performed by: INTERNAL MEDICINE

## 2024-06-24 PROCEDURE — 3048F LDL-C <100 MG/DL: CPT | Performed by: INTERNAL MEDICINE

## 2024-06-24 PROCEDURE — 3044F HG A1C LEVEL LT 7.0%: CPT | Performed by: INTERNAL MEDICINE

## 2024-06-24 PROCEDURE — 3074F SYST BP LT 130 MM HG: CPT | Performed by: INTERNAL MEDICINE

## 2024-06-24 PROCEDURE — 99214 OFFICE O/P EST MOD 30 MIN: CPT | Performed by: INTERNAL MEDICINE

## 2024-06-24 PROCEDURE — 3062F POS MACROALBUMINURIA REV: CPT | Performed by: INTERNAL MEDICINE

## 2024-06-24 PROCEDURE — RXMED WILLOW AMBULATORY MEDICATION CHARGE

## 2024-06-24 PROCEDURE — 3078F DIAST BP <80 MM HG: CPT | Performed by: INTERNAL MEDICINE

## 2024-06-24 RX ORDER — BLOOD-GLUCOSE SENSOR
EACH MISCELLANEOUS
Qty: 9 EACH | Refills: 3 | Status: SHIPPED | OUTPATIENT
Start: 2024-06-24

## 2024-06-24 ASSESSMENT — ENCOUNTER SYMPTOMS
SORE THROAT: 0
ABDOMINAL PAIN: 0
CONSTIPATION: 0
NAUSEA: 0
FREQUENCY: 0
VOMITING: 0
NERVOUS/ANXIOUS: 0
FEVER: 0
SHORTNESS OF BREATH: 0
ARTHRALGIAS: 0
APPETITE CHANGE: 0
POLYDIPSIA: 0
COUGH: 0
DIARRHEA: 0
HEADACHES: 0

## 2024-06-24 NOTE — PROGRESS NOTES
"History Of Present Illness  Emiliano Walker \"Bryn" is a 58 y.o. 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), Benign neoplasm, unspecified site (10/10/2017), Disorder of the skin and subcutaneous tissue, unspecified (10/05/2015), Hyperlipidemia, unspecified (06/21/2022), Other conditions influencing health status (10/10/2017), Other hypertrophic disorders of the skin (10/05/2015), Otitis media, unspecified, right ear (03/12/2021), Pain in left foot (10/29/2018), Pain in left leg (07/13/2019), Pain in unspecified wrist (12/26/2020), Personal history of diseases of the skin and subcutaneous tissue (08/26/2013), Personal history of other diseases of the circulatory system (12/29/2015), Personal history of other diseases of the respiratory system (07/13/2019), Personal history of other specified conditions (10/10/2017), Polyp of colon (10/10/2017), Superficial mycosis, unspecified (06/03/2020), Type 1 diabetes mellitus without complications (Multi) (12/19/2022), Unspecified acute noninfective otitis externa, right ear (04/02/2021), Unspecified injury of right wrist, hand and finger(s), initial encounter (12/28/2020), Unspecified injury of unspecified wrist, hand and finger(s), initial encounter, and Unspecified injury of unspecified wrist, hand and finger(s), initial encounter.    Surgical History  He has a past surgical history that includes Other surgical history (08/26/2013); " Colonoscopy (01/15/2019); Other surgical history (06/21/2022); and Hernia repair (April 10, 2024).     Social History  He reports that he has never smoked. He has never been exposed to tobacco smoke. He has never used smokeless tobacco. He reports that he does not drink alcohol and does not use drugs.    Family History  Family History   Problem Relation Name Age of Onset    Other (bypass) Father      Colon cancer Maternal Grandmother      Colon cancer Paternal Grandmother Cristina        Medications  Current Outpatient Medications   Medication Instructions    ascorbic acid (VITAMIN C) 250 mg, oral, Daily    aspirin 81 mg, oral, Daily    atorvastatin (Lipitor) 20 mg tablet TAKE 1 TABLET BY MOUTH ONE TIME DAILY    cholecalciferol (VITAMIN D3) 50 mcg, oral, Daily    Dexcom G6 Sensor device     Dexcom G6 Transmitter device     insulin lispro (HumaLOG U-100 Insulin) 100 unit/mL injection Continuous subcutaneous insulin pump infusion, 120 units per day.    multivitamin (Daily Multi-Vitamin) tablet 1 tablet, oral, Daily    sildenafil (Viagra) 100 mg tablet TAKE 1 TABLET BY MOUTH ONE TIME DAILY AS NEEDED       Allergies  Patient has no known allergies.    Review of Systems   Constitutional:  Negative for appetite change and fever.   HENT:  Negative for sore throat.         Denies dry mouth   Eyes:  Negative for visual disturbance.   Respiratory:  Negative for cough and shortness of breath.    Cardiovascular:  Negative for chest pain.   Gastrointestinal:  Negative for abdominal pain (resolved), constipation, diarrhea, nausea and vomiting.   Endocrine: Negative for polydipsia and polyuria.   Genitourinary:  Negative for frequency.   Musculoskeletal:  Negative for arthralgias.   Skin:  Negative for rash.   Neurological:  Negative for headaches.   Psychiatric/Behavioral:  The patient is not nervous/anxious.          Last Recorded Vitals  Blood pressure 125/74, pulse 69, weight 129 kg (285 lb).    Physical Exam  Constitutional:        General: He is not in acute distress.  HENT:      Head: Normocephalic.      Mouth/Throat:      Mouth: Mucous membranes are moist.   Eyes:      Extraocular Movements: Extraocular movements intact.   Neck:      Thyroid: No thyroid mass or thyromegaly.   Cardiovascular:      Pulses:           Radial pulses are 2+ on the right side and 2+ on the left side.   Musculoskeletal:      Right lower leg: Edema (trace) present.      Left lower leg: Edema (trace) present.   Lymphadenopathy:      Cervical: No cervical adenopathy.   Neurological:      Mental Status: He is alert.      Motor: No tremor.   Psychiatric:         Mood and Affect: Affect normal.          Relevant Results  Glucose (mg/dL)   Date Value   06/03/2024 137 (H)   04/10/2024 116 (H)   04/09/2024 113 (H)     POC HEMOGLOBIN A1c (%)   Date Value   12/18/2023 6.8 (A)     Hemoglobin A1C (%)   Date Value   06/03/2024 6.2 (H)   06/05/2023 6.0 (A)   08/18/2022 6.4 (A)   02/05/2022 6.5 (A)     Bicarbonate (mmol/L)   Date Value   06/03/2024 27   04/10/2024 24   04/09/2024 27     Urea Nitrogen (mg/dL)   Date Value   06/03/2024 12   04/10/2024 10   04/09/2024 11     Creatinine (mg/dL)   Date Value   06/03/2024 0.87   04/10/2024 0.67   04/09/2024 0.84     Lab Results   Component Value Date    CHOL 138 06/03/2024    CHOL 123 06/05/2023    CHOL 127 08/18/2022     Lab Results   Component Value Date    HDL 48.8 06/03/2024    HDL 45.3 06/05/2023    HDL 45.6 08/18/2022     Lab Results   Component Value Date    LDLCALC 78 06/03/2024     Lab Results   Component Value Date    TRIG 58 06/03/2024    TRIG 76 06/05/2023    TRIG 60 08/18/2022     Lab Results   Component Value Date    TSH 1.75 06/03/2024       IMPRESSION  TYPE 1 DIABETES MELLITUS  INSULIN PUMP STATUS  Excellent overall glucose control  Recent abdominal hernia repair      RECOMMENDATIONS  Continue current program    Follow up 6 months.     Upgrade to DexCom G7

## 2024-06-24 NOTE — LETTER
"June 24, 2024     Marina Martinez, APRN-CNP  [address]  [facility]  [facility] [address]    Patient: Lobito Walker   YOB: 1965   Date of Visit: 6/24/2024       Dear Dr. Marina Martinez, APRN-CNP:    Thank you for referring Lobito Walker to me for evaluation. Below are my notes for this consultation.  If you have questions, please do not hesitate to call me. I look forward to following your patient along with you.       Sincerely,     Tyrell Bates MD      CC: No Recipients  ______________________________________________________________________________________    History Of Present Illness  Emiliano Walker \"Bryn" is a 58 y.o. 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), Benign neoplasm, unspecified site (10/10/2017), Disorder of the skin and subcutaneous tissue, unspecified (10/05/2015), Hyperlipidemia, unspecified (06/21/2022), Other conditions influencing health status (10/10/2017), Other hypertrophic disorders of the skin (10/05/2015), Otitis media, unspecified, right ear (03/12/2021), Pain in left foot (10/29/2018), Pain in left leg (07/13/2019), Pain in unspecified wrist (12/26/2020), Personal history of diseases of the skin and subcutaneous tissue (08/26/2013), Personal history of other diseases of the circulatory system (12/29/2015), Personal history of other diseases of the respiratory system (07/13/2019), Personal history of " other specified conditions (10/10/2017), Polyp of colon (10/10/2017), Superficial mycosis, unspecified (06/03/2020), Type 1 diabetes mellitus without complications (Multi) (12/19/2022), Unspecified acute noninfective otitis externa, right ear (04/02/2021), Unspecified injury of right wrist, hand and finger(s), initial encounter (12/28/2020), Unspecified injury of unspecified wrist, hand and finger(s), initial encounter, and Unspecified injury of unspecified wrist, hand and finger(s), initial encounter.    Surgical History  He has a past surgical history that includes Other surgical history (08/26/2013); Colonoscopy (01/15/2019); Other surgical history (06/21/2022); and Hernia repair (April 10, 2024).     Social History  He reports that he has never smoked. He has never been exposed to tobacco smoke. He has never used smokeless tobacco. He reports that he does not drink alcohol and does not use drugs.    Family History  Family History   Problem Relation Name Age of Onset   • Other (bypass) Father     • Colon cancer Maternal Grandmother     • Colon cancer Paternal Grandmother Cristina        Medications  Current Outpatient Medications   Medication Instructions   • ascorbic acid (VITAMIN C) 250 mg, oral, Daily   • aspirin 81 mg, oral, Daily   • atorvastatin (Lipitor) 20 mg tablet TAKE 1 TABLET BY MOUTH ONE TIME DAILY   • cholecalciferol (VITAMIN D3) 50 mcg, oral, Daily   • Dexcom G6 Sensor device    • Dexcom G6 Transmitter device    • insulin lispro (HumaLOG U-100 Insulin) 100 unit/mL injection Continuous subcutaneous insulin pump infusion, 120 units per day.   • multivitamin (Daily Multi-Vitamin) tablet 1 tablet, oral, Daily   • sildenafil (Viagra) 100 mg tablet TAKE 1 TABLET BY MOUTH ONE TIME DAILY AS NEEDED       Allergies  Patient has no known allergies.    Review of Systems   Constitutional:  Negative for appetite change and fever.   HENT:  Negative for sore throat.         Denies dry mouth   Eyes:  Negative for  visual disturbance.   Respiratory:  Negative for cough and shortness of breath.    Cardiovascular:  Negative for chest pain.   Gastrointestinal:  Negative for abdominal pain (resolved), constipation, diarrhea, nausea and vomiting.   Endocrine: Negative for polydipsia and polyuria.   Genitourinary:  Negative for frequency.   Musculoskeletal:  Negative for arthralgias.   Skin:  Negative for rash.   Neurological:  Negative for headaches.   Psychiatric/Behavioral:  The patient is not nervous/anxious.          Last Recorded Vitals  Blood pressure 125/74, pulse 69, weight 129 kg (285 lb).    Physical Exam  Constitutional:       General: He is not in acute distress.  HENT:      Head: Normocephalic.      Mouth/Throat:      Mouth: Mucous membranes are moist.   Eyes:      Extraocular Movements: Extraocular movements intact.   Neck:      Thyroid: No thyroid mass or thyromegaly.   Cardiovascular:      Pulses:           Radial pulses are 2+ on the right side and 2+ on the left side.   Musculoskeletal:      Right lower leg: Edema (trace) present.      Left lower leg: Edema (trace) present.   Lymphadenopathy:      Cervical: No cervical adenopathy.   Neurological:      Mental Status: He is alert.      Motor: No tremor.   Psychiatric:         Mood and Affect: Affect normal.          Relevant Results  Glucose (mg/dL)   Date Value   06/03/2024 137 (H)   04/10/2024 116 (H)   04/09/2024 113 (H)     POC HEMOGLOBIN A1c (%)   Date Value   12/18/2023 6.8 (A)     Hemoglobin A1C (%)   Date Value   06/03/2024 6.2 (H)   06/05/2023 6.0 (A)   08/18/2022 6.4 (A)   02/05/2022 6.5 (A)     Bicarbonate (mmol/L)   Date Value   06/03/2024 27   04/10/2024 24   04/09/2024 27     Urea Nitrogen (mg/dL)   Date Value   06/03/2024 12   04/10/2024 10   04/09/2024 11     Creatinine (mg/dL)   Date Value   06/03/2024 0.87   04/10/2024 0.67   04/09/2024 0.84     Lab Results   Component Value Date    CHOL 138 06/03/2024    CHOL 123 06/05/2023    CHOL 127 08/18/2022      Lab Results   Component Value Date    HDL 48.8 06/03/2024    HDL 45.3 06/05/2023    HDL 45.6 08/18/2022     Lab Results   Component Value Date    LDLCALC 78 06/03/2024     Lab Results   Component Value Date    TRIG 58 06/03/2024    TRIG 76 06/05/2023    TRIG 60 08/18/2022     Lab Results   Component Value Date    TSH 1.75 06/03/2024       IMPRESSION  TYPE 1 DIABETES MELLITUS  INSULIN PUMP STATUS  Excellent overall glucose control  Recent abdominal hernia repair      RECOMMENDATIONS  Continue current program    Follow up 6 months.     Upgrade to DexCom G7

## 2024-06-25 ENCOUNTER — APPOINTMENT (OUTPATIENT)
Dept: PRIMARY CARE | Facility: CLINIC | Age: 59
End: 2024-06-25
Payer: COMMERCIAL

## 2024-06-25 ENCOUNTER — LAB (OUTPATIENT)
Dept: LAB | Facility: LAB | Age: 59
End: 2024-06-25
Payer: COMMERCIAL

## 2024-06-25 VITALS
WEIGHT: 285 LBS | SYSTOLIC BLOOD PRESSURE: 133 MMHG | BODY MASS INDEX: 34.7 KG/M2 | DIASTOLIC BLOOD PRESSURE: 79 MMHG | OXYGEN SATURATION: 96 % | HEIGHT: 76 IN | HEART RATE: 69 BPM

## 2024-06-25 DIAGNOSIS — Z11.59 ENCOUNTER FOR HEPATITIS C SCREENING TEST FOR LOW RISK PATIENT: ICD-10-CM

## 2024-06-25 DIAGNOSIS — Z96.41 INSULIN PUMP STATUS: ICD-10-CM

## 2024-06-25 DIAGNOSIS — Z11.4 SCREENING FOR HIV WITHOUT PRESENCE OF RISK FACTORS: ICD-10-CM

## 2024-06-25 DIAGNOSIS — Z12.5 SCREENING FOR PROSTATE CANCER: ICD-10-CM

## 2024-06-25 DIAGNOSIS — Z00.00 ANNUAL PHYSICAL EXAM: Primary | ICD-10-CM

## 2024-06-25 DIAGNOSIS — E10.9 TYPE 1 DIABETES MELLITUS WITHOUT COMPLICATION (MULTI): ICD-10-CM

## 2024-06-25 PROCEDURE — 3062F POS MACROALBUMINURIA REV: CPT | Performed by: NURSE PRACTITIONER

## 2024-06-25 PROCEDURE — 84154 ASSAY OF PSA FREE: CPT

## 2024-06-25 PROCEDURE — 86803 HEPATITIS C AB TEST: CPT

## 2024-06-25 PROCEDURE — 3048F LDL-C <100 MG/DL: CPT | Performed by: NURSE PRACTITIONER

## 2024-06-25 PROCEDURE — 84153 ASSAY OF PSA TOTAL: CPT

## 2024-06-25 PROCEDURE — 36415 COLL VENOUS BLD VENIPUNCTURE: CPT

## 2024-06-25 PROCEDURE — 3078F DIAST BP <80 MM HG: CPT | Performed by: NURSE PRACTITIONER

## 2024-06-25 PROCEDURE — 3075F SYST BP GE 130 - 139MM HG: CPT | Performed by: NURSE PRACTITIONER

## 2024-06-25 PROCEDURE — 99396 PREV VISIT EST AGE 40-64: CPT | Performed by: NURSE PRACTITIONER

## 2024-06-25 PROCEDURE — 87389 HIV-1 AG W/HIV-1&-2 AB AG IA: CPT

## 2024-06-25 PROCEDURE — 3044F HG A1C LEVEL LT 7.0%: CPT | Performed by: NURSE PRACTITIONER

## 2024-06-25 PROCEDURE — 1036F TOBACCO NON-USER: CPT | Performed by: NURSE PRACTITIONER

## 2024-06-25 NOTE — PATIENT INSTRUCTIONS
Thank you for seeing me today.  It was a pleasure to see you again!    Today we did your Annual Physical Exam and discussed the following:     Continue medications as reviewed during office visit today    PSA, Hep C and HIV testing ordered     For assistance with scheduling referrals or consultations, please call [phone] or [phone].    For laboratory, radiology, and other tests, please call [phone] ([phone] for pediatrics).   If you do not get results within 7-10 days, or you have any questions or concerns, please send a message, call the office ([phone]), or return to the office for a follow-up appointment.     For acute/sick visits, if you are unable to get an office visit, you can do a  On Demand Virtual Visit that is accessible via your My Chart account.  For emergencies, call 9-1-1 or go to the nearest Emergency Department.     Please schedule additional appointment(s) to address concern(s) not addressed today.    Please review prescription inserts and published information for possible adverse effects of all medications.     In general, results are discussed over the phone or via  [email or web address].     You can see your health information, review clinical summaries from office visits & test results online when you follow your health with MY  Chart, a personal health record.   To sign up go to [email or web address].   If you need assistance with signing up or trouble getting into your account call [email or web address] Patient Line 24/7 at [phone]     [facility] ANNUALLY AND AS NEEDED

## 2024-06-25 NOTE — ASSESSMENT & PLAN NOTE
- Counseled on healthy diet and regular exercise  - Fall avoidance information provided  - Personalized prevention plan provided   - Colon UTD  - Vaccines UTD

## 2024-06-25 NOTE — PROGRESS NOTES
"Subjective   Reason for Visit: Emiliano Walker is an 58 y.o. male here for a CPE     Chief Complaint   Patient presents with    Annual Exam     Emiliano Walker is a 58 y.o. male is here today for a CPE. Patient reports no questions or concerns at this time.         HPI  Emiliano \"Bryn" is an est 59 yo male presenting today for Annual CPE     Dx: T1DM    Specialists: I have reviewed specialist-related care of the patient in the medical record.   Olga Lidia:  Dr. Bates for T1DM   Derm: WANDER Garcia     Pt reports doing well today  No acute c/o     Health Maintenance Due   Topic Date Due    Yearly Adult Physical  Never done    Diabetes: Celiac Disease Screening  Never done    HIV Screening  Never done    MMR Vaccines (1 of 1 - Standard series) Never done    Pneumococcal Vaccine: Pediatrics (0 to 5 Years) and At-Risk Patients (6 to 64 Years) (1 of 2 - PCV) 10/14/1971    Hepatitis C Screening  Never done    Hepatitis B Vaccines (1 of 3 - 19+ 3-dose series) Never done    COVID-19 Vaccine (5 - 2023-24 season) 09/01/2023    Diabetes: Retinopathy Screening  12/14/2023       Occupation: [employer] at [employer]  Looking to retire in 3 yrs     Do you take any herbs or supplements that were not prescribed by a doctor? Vitamin D, C, MVI  Are you taking aspirin daily? Yes     Colon cancer screening: due 8/2028  PSA: 2021  Fasting blood work: UTD, June 2024 (per Endo)  HIV/HEP C Screening: Due   Last eye exam: 2023  Last dental Exam: 2024  Exercise: not regularly   Mood: no concerns  Sleep: no concerns   Vaccines: UTD    #T1DM  Followed by Dr. Jana Duggan  FBW done in June 2024  Last A1C= 6.2% June 2024  Rx: Insulin lispro via pump   Statin: yes  ACE/ARB: no  CGM: Dexcom 6  Last eye exam: Dec 2023  Gets pedicures every few weeks.     No Known Allergies    Lab on 06/03/2024   Component Date Value Ref Range Status    Glucose 06/03/2024 137 (H)  74 - 99 mg/dL Final    Sodium 06/03/2024 137  136 - 145 mmol/L Final    Potassium 06/03/2024 " 4.1  3.5 - 5.3 mmol/L Final    Chloride 06/03/2024 103  98 - 107 mmol/L Final    Bicarbonate 06/03/2024 27  21 - 32 mmol/L Final    Anion Gap 06/03/2024 11  10 - 20 mmol/L Final    Urea Nitrogen 06/03/2024 12  6 - 23 mg/dL Final    Creatinine 06/03/2024 0.87  0.50 - 1.30 mg/dL Final    eGFR 06/03/2024 >90  >60 mL/min/1.73m*2 Final    Calculations of estimated GFR are performed using the 2021 CKD-EPI Study Refit equation without the race variable for the IDMS-Traceable creatinine methods.  https://jasn.asnjournals.org/content/early/2021/09/22/ASN.1096083304    Calcium 06/03/2024 9.3  8.6 - 10.3 mg/dL Final    Albumin 06/03/2024 4.2  3.4 - 5.0 g/dL Final    Alkaline Phosphatase 06/03/2024 83  33 - 120 U/L Final    Total Protein 06/03/2024 6.2 (L)  6.4 - 8.2 g/dL Final    AST 06/03/2024 17  9 - 39 U/L Final    Bilirubin, Total 06/03/2024 0.7  0.0 - 1.2 mg/dL Final    ALT 06/03/2024 19  10 - 52 U/L Final    Patients treated with Sulfasalazine may generate falsely decreased results for ALT.    WBC 06/03/2024 6.2  4.4 - 11.3 x10*3/uL Final    nRBC 06/03/2024 0.0  0.0 - 0.0 /100 WBCs Final    RBC 06/03/2024 4.74  4.50 - 5.90 x10*6/uL Final    Hemoglobin 06/03/2024 14.6  13.5 - 17.5 g/dL Final    Hematocrit 06/03/2024 44.0  41.0 - 52.0 % Final    MCV 06/03/2024 93  80 - 100 fL Final    MCH 06/03/2024 30.8  26.0 - 34.0 pg Final    MCHC 06/03/2024 33.2  32.0 - 36.0 g/dL Final    RDW 06/03/2024 12.7  11.5 - 14.5 % Final    Platelets 06/03/2024 233  150 - 450 x10*3/uL Final    Hemoglobin A1C 06/03/2024 6.2 (H)  see below % Final    Estimated Average Glucose 06/03/2024 131  Not Established mg/dL Final    Cholesterol 06/03/2024 138  0 - 199 mg/dL Final          Age      Desirable   Borderline High   High     0-19 Y     0 - 169       170 - 199     >/= 200    20-24 Y     0 - 189       190 - 224     >/= 225         >24 Y     0 - 199       200 - 239     >/= 240   **All ranges are based on fasting samples. Specific   therapeutic  targets will vary based on patient-specific   cardiac risk.    Pediatric guidelines reference:Pediatrics 2011, 128(S5).Adult guidelines reference: NCEP ATPIII Guidelines,NATASHA 2001, 258:2486-97    Venipuncture immediately after or during the administration of Metamizole may lead to falsely low results. Testing should be performed immediately prior to Metamizole dosing.    HDL-Cholesterol 06/03/2024 48.8  mg/dL Final      Age       Very Low   Low     Normal    High    0-19 Y    < 35      < 40     40-45     ----  20-24 Y    ----     < 40      >45      ----        >24 Y      ----     < 40     40-60      >60      Cholesterol/HDL Ratio 06/03/2024 2.8   Final      Ref Values  Desirable  < 3.4  High Risk  > 5.0    LDL Calculated 06/03/2024 78  <=99 mg/dL Final                                Near   Borderline      AGE      Desirable  Optimal    High     High     Very High     0-19 Y     0 - 109     ---    110-129   >/= 130     ----    20-24 Y     0 - 119     ---    120-159   >/= 160     ----      >24 Y     0 -  99   100-129  130-159   160-189     >/=190      VLDL 06/03/2024 12  0 - 40 mg/dL Final    Triglycerides 06/03/2024 58  0 - 149 mg/dL Final       Age         Desirable   Borderline High   High     Very High   0 D-90 D    19 - 174         ----         ----        ----  91 D- 9 Y     0 -  74        75 -  99     >/= 100      ----    10-19 Y     0 -  89        90 - 129     >/= 130      ----    20-24 Y     0 - 114       115 - 149     >/= 150      ----         >24 Y     0 - 149       150 - 199    200- 499    >/= 500    Venipuncture immediately after or during the administration of Metamizole may lead to falsely low results. Testing should be performed immediately prior to Metamizole dosing.    Non HDL Cholesterol 06/03/2024 89  0 - 149 mg/dL Final          Age       Desirable   Borderline High   High     Very High     0-19 Y     0 - 119       120 - 144     >/= 145    >/= 160    20-24 Y     0 - 149       150 - 189     >/= 190  "     ----         >24 Y    30 mg/dL above LDL Cholesterol goal      Thyroid Stimulating Hormone 06/03/2024 1.75  0.44 - 3.98 mIU/L Final    Albumin, Urine Random 06/03/2024 <7.0  Not established mg/L Final    Creatinine, Urine Random 06/03/2024 62.8  20.0 - 370.0 mg/dL Final    Albumin/Creatine Ratio 06/03/2024    Final    One or more analytes used in this calculation is outside of the analytical measurement range. Calculation cannot be performed.         Patient Care Team:  VIRY Barksdale as PCP - General (Family Medicine)  VIRY Barksdale as PCP - Employee [employer] PCP     Review of Systems  ROS was completed and all systems are negative with the exception of what was noted in the the HPI.       Objective   Vitals:  /79   Pulse 69   Ht 1.93 m (6' 4\")   Wt 129 kg (285 lb)   SpO2 96%   BMI 34.69 kg/m²       Current Outpatient Medications   Medication Instructions    ascorbic acid (VITAMIN C) 250 mg, oral, Daily    aspirin 81 mg, oral, Daily    atorvastatin (Lipitor) 20 mg tablet TAKE 1 TABLET BY MOUTH ONE TIME DAILY    cholecalciferol (VITAMIN D3) 50 mcg, oral, Daily    Dexcom G7 Sensor device For continuous glucose monitoring.  Change every 10 days.    insulin lispro (HumaLOG U-100 Insulin) 100 unit/mL injection Continuous subcutaneous insulin pump infusion, 120 units per day.    multivitamin (Daily Multi-Vitamin) tablet 1 tablet, oral, Daily    sildenafil (Viagra) 100 mg tablet TAKE 1 TABLET BY MOUTH ONE TIME DAILY AS NEEDED         Physical Exam  Vitals reviewed.   HENT:      Right Ear: Tympanic membrane normal.      Left Ear: Tympanic membrane normal.      Mouth/Throat:      Mouth: Mucous membranes are moist.   Eyes:      Conjunctiva/sclera: Conjunctivae normal.   Cardiovascular:      Pulses: Normal pulses.      Heart sounds: Normal heart sounds.   Pulmonary:      Effort: Pulmonary effort is normal.      Breath sounds: Normal breath sounds.   Abdominal:      General: Bowel sounds are " normal.   Musculoskeletal:         General: Normal range of motion.   Feet:      Right foot:      Protective Sensation: 5 sites tested.  5 sites sensed.      Skin integrity: Skin integrity normal.      Toenail Condition: Right toenails are normal.      Left foot:      Protective Sensation: 5 sites tested.  5 sites sensed.      Skin integrity: Skin integrity normal.      Toenail Condition: Left toenails are normal.   Skin:     General: Skin is warm and dry.   Neurological:      Mental Status: He is alert and oriented to person, place, and time.   Psychiatric:         Mood and Affect: Mood normal.         Assessment/Plan   Problem List Items Addressed This Visit             ICD-10-CM    Annual physical exam - Primary Z00.00     - Counseled on healthy diet and regular exercise  - Fall avoidance information provided  - Personalized prevention plan provided   - Colon UTD  - Vaccines UTD          Insulin pump status Z96.41     Started w/ pump in 2006  Continue current medications and follow-up at least yearly.  Current pump x 1 year             Type 1 diabetes mellitus (Multi) E10.9     Followed by Dr. Jana Duggan  FBW done in June 2024  Last A1C= 6.2% June 2024  Rx: Insulin lispro via pump   Statin: yes  ACE/ARB: no  CGM: Dexcom 6  Diabetic foot exam today           Other Visit Diagnoses         Codes    Encounter for hepatitis C screening test for low risk patient     Z11.59    Relevant Orders    Hepatitis C Antibody    Screening for HIV without presence of risk factors     Z11.4    Relevant Orders    HIV 1/2 Antigen/Antibody Screen with Reflex to Confirmation    Screening for prostate cancer     Z12.5    Relevant Orders    PSA, Total and Free

## 2024-06-25 NOTE — ASSESSMENT & PLAN NOTE
Followed by Dr. Jana Duggan  FBW done in June 2024  Last A1C= 6.2% June 2024  Rx: Insulin lispro via pump   Statin: yes  ACE/ARB: no  CGM: Dexcom 6  Diabetic foot exam today

## 2024-06-25 NOTE — ASSESSMENT & PLAN NOTE
Started w/ pump in 2006  Continue current medications and follow-up at least yearly.  Current pump x 1 year

## 2024-06-26 LAB
HCV AB SER QL: NONREACTIVE
HIV 1+2 AB+HIV1 P24 AG SERPL QL IA: NONREACTIVE

## 2024-06-27 LAB
PSA FREE MFR SERPL: 40 %
PSA FREE SERPL-MCNC: 0.2 NG/ML
PSA SERPL IA-MCNC: 0.5 NG/ML (ref 0–4)

## 2024-07-19 ENCOUNTER — APPOINTMENT (OUTPATIENT)
Dept: DERMATOLOGY | Facility: CLINIC | Age: 59
End: 2024-07-19
Payer: COMMERCIAL

## 2024-07-19 DIAGNOSIS — D48.5 NEOPLASM OF UNCERTAIN BEHAVIOR OF SKIN: Primary | ICD-10-CM

## 2024-07-19 PROCEDURE — 3062F POS MACROALBUMINURIA REV: CPT | Performed by: NURSE PRACTITIONER

## 2024-07-19 PROCEDURE — 11102 TANGNTL BX SKIN SINGLE LES: CPT | Performed by: NURSE PRACTITIONER

## 2024-07-19 PROCEDURE — 3044F HG A1C LEVEL LT 7.0%: CPT | Performed by: NURSE PRACTITIONER

## 2024-07-19 PROCEDURE — 3048F LDL-C <100 MG/DL: CPT | Performed by: NURSE PRACTITIONER

## 2024-07-19 PROCEDURE — 1036F TOBACCO NON-USER: CPT | Performed by: NURSE PRACTITIONER

## 2024-07-19 PROCEDURE — 99213 OFFICE O/P EST LOW 20 MIN: CPT | Performed by: NURSE PRACTITIONER

## 2024-07-19 NOTE — PROGRESS NOTES
Subjective   Lobito Walker is a 58 y.o. male who presents for the following: Suspicious Skin Lesion.  Skin Lesion  He describes it as a growth, that is located on his face.  It was first noticed a few weeks ago. It has been causing no skin symptoms and is not changing. Previously this spot has been     Objective   Well appearing patient in no apparent distress; mood and affect are within normal limits.    A focused examination was performed including head, including the scalp, face, neck, nose, ears, eyelids, and lips. All findings within normal limits unless otherwise noted below.      Assessment/Plan   Neoplasm of uncertain behavior of skin  Right Malar Cheek    Lesion biopsy  Type of biopsy: tangential    Informed consent: discussed and consent obtained    Timeout: patient name, date of birth, surgical site, and procedure verified    Procedure prep:  Patient was prepped and draped  Anesthesia: the lesion was anesthetized in a standard fashion    Anesthetic:  1% lidocaine w/ epinephrine 1-100,000 local infiltration  Instrument used: DermaBlade    Hemostasis achieved with: aluminum chloride    Outcome: patient tolerated procedure well    Post-procedure details: sterile dressing applied and wound care instructions given    Dressing type: petrolatum and bandage      Specimen 1 - Dermatopathology- DERM LAB  Differential Diagnosis: sk vs wart vs nmsc  Check Margins Yes/No?:    Comments:    Dermpath Lab: Routine Histopathology (formalin-fixed tissue)    -  Discussed differential with patient.   - Given uncertainty of clinical diagnosis, a biopsy is recommended in clinic today.   - The patient expressed understanding, is in agreement with this plan, and wishes to proceed with biopsy.   - Oral and written wound care instructions provided.   - Advised the patient that the office will call within 2 weeks to discuss biopsy results.     Will call with pathology results. Follow up as scheduled. Please call me if there are any  changes or development of concerning symptoms (lesion/skin condition is changing, bleeding, enlarging, or worsening).

## 2024-07-23 LAB
LABORATORY COMMENT REPORT: NORMAL
PATH REPORT.FINAL DX SPEC: NORMAL
PATH REPORT.GROSS SPEC: NORMAL
PATH REPORT.MICROSCOPIC SPEC OTHER STN: NORMAL
PATH REPORT.RELEVANT HX SPEC: NORMAL
PATH REPORT.TOTAL CANCER: NORMAL

## 2024-07-30 ENCOUNTER — PHARMACY VISIT (OUTPATIENT)
Dept: PHARMACY | Facility: CLINIC | Age: 59
End: 2024-07-30
Payer: COMMERCIAL

## 2024-07-30 PROCEDURE — RXMED WILLOW AMBULATORY MEDICATION CHARGE

## 2024-08-26 DIAGNOSIS — E10.9 TYPE 1 DIABETES MELLITUS WITHOUT COMPLICATION (MULTI): Primary | ICD-10-CM

## 2024-09-16 DIAGNOSIS — N52.9 ERECTILE DYSFUNCTION, UNSPECIFIED ERECTILE DYSFUNCTION TYPE: ICD-10-CM

## 2024-09-16 PROCEDURE — RXMED WILLOW AMBULATORY MEDICATION CHARGE

## 2024-09-16 RX ORDER — SILDENAFIL 100 MG/1
100 TABLET, FILM COATED ORAL DAILY PRN
Qty: 10 TABLET | Refills: 3 | Status: SHIPPED | OUTPATIENT
Start: 2024-09-16 | End: 2025-09-16

## 2024-09-17 ENCOUNTER — PHARMACY VISIT (OUTPATIENT)
Dept: PHARMACY | Facility: CLINIC | Age: 59
End: 2024-09-17
Payer: COMMERCIAL

## 2024-09-17 PROCEDURE — RXMED WILLOW AMBULATORY MEDICATION CHARGE

## 2024-09-25 ENCOUNTER — APPOINTMENT (OUTPATIENT)
Dept: PODIATRY | Facility: CLINIC | Age: 59
End: 2024-09-25
Payer: COMMERCIAL

## 2024-09-25 DIAGNOSIS — E10.9 TYPE 1 DIABETES MELLITUS WITHOUT COMPLICATION: Primary | ICD-10-CM

## 2024-09-25 DIAGNOSIS — L60.1 ONYCHOLYSIS: ICD-10-CM

## 2024-09-25 DIAGNOSIS — E11.9 ENCOUNTER FOR DIABETIC FOOT EXAM (MULTI): ICD-10-CM

## 2024-09-25 PROCEDURE — 1036F TOBACCO NON-USER: CPT | Performed by: PODIATRIST

## 2024-09-25 PROCEDURE — 99212 OFFICE O/P EST SF 10 MIN: CPT | Performed by: PODIATRIST

## 2024-09-25 PROCEDURE — 3048F LDL-C <100 MG/DL: CPT | Performed by: PODIATRIST

## 2024-09-25 PROCEDURE — 3062F POS MACROALBUMINURIA REV: CPT | Performed by: PODIATRIST

## 2024-09-25 PROCEDURE — 3044F HG A1C LEVEL LT 7.0%: CPT | Performed by: PODIATRIST

## 2024-09-25 NOTE — PROGRESS NOTES
History of Present Illness:   Patient states they are here for Dm exam  Denies NTB to feet  Most recent A1C is 6.2  Left hallux causing pain, concern of ingrown.   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    Acquired plantar porokeratosis    Benign neoplasm, unspecified site 10/10/2017    Tubular adenoma    Disorder of the skin and subcutaneous tissue, unspecified 10/05/2015    Skin lesion    Hyperlipidemia, 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/21/2022    Hyperlipidemia    Other conditions influencing health status 10/10/2017    Weight loss, intentional    Other hypertrophic disorders of the skin 10/05/2015    Skin tag    Otitis media, unspecified, right ear 03/12/2021    Right acute otitis media    Pain in left foot 10/29/2018    Left foot pain    Pain in left leg 07/13/2019    Pain of left lower extremity    Pain in unspecified wrist 12/26/2020    Wrist pain    Personal history of diseases of the skin and subcutaneous tissue 08/26/2013    History of local infection of skin and subcutaneous tissue    Personal history of other diseases of the circulatory system 12/29/2015    History of coronary atherosclerosis    Personal history of other diseases of the respiratory system 07/13/2019    History of acute bronchitis    Personal history of other specified conditions 10/10/2017    History of weight loss    Polyp of colon 10/10/2017    Benign colon polyp    Superficial mycosis, 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/03/2020    Fungal dermatitis    Type 1 diabetes mellitus without complications (Multi) 12/19/2022    Type 1 diabetes mellitus    Unspecified acute noninfective otitis externa, right ear 04/02/2021    Acute otitis externa of right ear, unspecified type    Unspecified injury of right wrist, hand and finger(s), initial encounter 12/28/2020    Injury of right hand, initial encounter    Unspecified injury of unspecified wrist, hand and finger(s),  initial encounter     Hand injury    Unspecified injury of unspecified wrist, hand and finger(s), initial encounter     Finger injury       Medications and Allergies have been reviewed.    Review Of Systems:  GENERAL: No weight loss, malaise or fevers.  HEENT: Negative for frequent or significant headaches,   RESPIRATORY: Negative for cough, wheezing or shortness of breath.  CARDIOVASCULAR: Negative for chest pain, leg swelling or palpitations.    Physical Exam:  Patient is a pleasant, cooperative, well developed 58 y.o.  adult male. The patient is alert and oriented to time, place and person.   Patient has normal affect and mood.    Examination of Both Lower Extremities:   Vascular exam: Dorsalis pedis and Posterior Tibial pulses palpable 2/4 bilateral. Capillary refill time less than 3 seconds b/l. Hair growth noted to digits. No edema noted. Skin temp warm to warm from proximal to distal b/l. +varicosities noted.     Neurologic exam: Vibratory, protective and proprioception intact b/l. No neurologic deficits noted.      Musculoskeletal exam: Muscle strength 5/5 for all major muscle groups tested in the lower extremity b/l. No gross deformities noted b/l.      Dermatologic exam: Left hallux not attached to base distallly. Debrided loose nail  No SOI noted  Debrided remaining nails  No HP tissue noted     1. Type 1 diabetes mellitus without complication (Multi)        2. Onycholysis        3. Encounter for diabetic foot exam (Multi)          Patient examined and evaluated.   Patient educated on proper diabetic foot care and education dispensed.  A1C revd 6.2  Debrided nails  Left nail debrided   Fu 12 mos for Dm exam  Low pedal risk noted  Sooner if any new prob arise  Patient was in agreement to this plan. All questions answered.      Kelli John DPM  [phone]  Option 2  Fax: [phone]

## 2024-10-16 ENCOUNTER — APPOINTMENT (OUTPATIENT)
Dept: DERMATOLOGY | Facility: CLINIC | Age: 59
End: 2024-10-16
Payer: COMMERCIAL

## 2024-10-16 ENCOUNTER — OFFICE VISIT (OUTPATIENT)
Dept: DERMATOLOGY | Facility: CLINIC | Age: 59
End: 2024-10-16

## 2024-10-16 DIAGNOSIS — L91.8 SKIN TAG: ICD-10-CM

## 2024-10-16 DIAGNOSIS — L57.9 SKIN CHANGES DUE TO CHRONIC EXPOSURE TO NONIONIZING RADIATION: ICD-10-CM

## 2024-10-16 DIAGNOSIS — L73.8 SEBACEOUS HYPERPLASIA OF FACE: ICD-10-CM

## 2024-10-16 DIAGNOSIS — L73.8 SEBACEOUS HYPERPLASIA OF FACE: Primary | ICD-10-CM

## 2024-10-16 DIAGNOSIS — L81.4 LENTIGO: ICD-10-CM

## 2024-10-16 DIAGNOSIS — D22.9 BENIGN NEVUS: ICD-10-CM

## 2024-10-16 DIAGNOSIS — L82.1 SEBORRHEIC KERATOSIS: ICD-10-CM

## 2024-10-16 DIAGNOSIS — D18.01 ANGIOMA OF SKIN: Primary | ICD-10-CM

## 2024-10-16 PROCEDURE — 99213 OFFICE O/P EST LOW 20 MIN: CPT | Performed by: NURSE PRACTITIONER

## 2024-10-16 PROCEDURE — 3044F HG A1C LEVEL LT 7.0%: CPT | Performed by: NURSE PRACTITIONER

## 2024-10-16 PROCEDURE — 1036F TOBACCO NON-USER: CPT | Performed by: NURSE PRACTITIONER

## 2024-10-16 PROCEDURE — 3062F POS MACROALBUMINURIA REV: CPT | Performed by: NURSE PRACTITIONER

## 2024-10-16 PROCEDURE — 17110 DESTRUCTION B9 LES UP TO 14: CPT | Performed by: NURSE PRACTITIONER

## 2024-10-16 PROCEDURE — 3048F LDL-C <100 MG/DL: CPT | Performed by: NURSE PRACTITIONER

## 2024-10-16 NOTE — PROGRESS NOTES
Subjective     Lobito Walker is a 59 y.o. male who presents for the following: No chief complaint on file..     Review of Systems:  No other skin or systemic complaints other than what is documented elsewhere in the note.    The following portions of the chart were reviewed this encounter and updated as appropriate:          Skin Cancer History  No skin cancer on file.      Specialty Problems          Dermatology Problems    Atopic dermatitis    Intertriginous candidiasis        Objective   Well appearing patient in no apparent distress; mood and affect are within normal limits.    A focused skin examination was performed. All findings within normal limits unless otherwise noted below.    Assessment/Plan   1. Sebaceous hyperplasia of face (3)  Mid Forehead (3)  Small yellow, lobulated papules with a central dell.    Sebaceous hyperplasia is a common harmless enlargement of the skin oil glands.    Many types of treatment can remove the lesions, with a small risk of leaving scars:  Burning (cautery)  Freezing (cryosurgery)  Applying topical chemicals  Applying a drug activated by light (photodynamic therapy)  Laser treatment  Cutting out the lesions (excision)    The present appearance of the lesion is not worrisome but it should continue to be observed and testing/treatment may be warranted if change occurs.    Patient understands treatment of sebaceous hyperplasia are cosmetic and he wants to proceed with treatment today and will pay out of pocket cost.     Destr of lesion - Mid Forehead (3)  Complexity: simple    Destruction method: cryotherapy    Informed consent: discussed and consent obtained    Timeout:  patient name, date of birth, surgical site, and procedure verified  Lesion destroyed using liquid nitrogen: Yes    Outcome: patient tolerated procedure well with no complications    Post-procedure details: wound care instructions given      2. Skin tag  Left Upper Arm - Posterior  Fleshy, skin-colored sessile  and pedunculated papules.     A skin tag (acrochordon) is a common condition that appears as a small, soft skin growth, often on a thin stalk. Skin tags are often found on areas of frequent friction, such as the eyelids, neck, underarms, and groin. They are harmless, but they can sometimes become irritated from rubbing on clothing or jewelry, for example.    Treated left upper arm with LN2 as well per patient consent.

## 2024-10-16 NOTE — ADDENDUM NOTE
Addended by: SYBIL ARREDONDO on: 10/16/2024 04:12 PM     Modules accepted: Orders, Level of Service

## 2024-10-16 NOTE — PROGRESS NOTES
Subjective     Lobito Walker is a 59 y.o. male who presents for the following: Skin Check.     Review of Systems:  No other skin or systemic complaints other than what is documented elsewhere in the note.    The following portions of the chart were reviewed this encounter and updated as appropriate:   Tobacco  Allergies  Meds  Problems  Med Hx  Surg Hx         Skin Cancer History  No skin cancer on file.      Specialty Problems          Dermatology Problems    Atopic dermatitis    Intertriginous candidiasis        Objective   Well appearing patient in no apparent distress; mood and affect are within normal limits.    A full examination was performed including scalp, head, eyes, ears, nose, lips, neck, chest, axillae, abdomen, back, buttocks, bilateral upper extremities, bilateral lower extremities, hands, feet, fingers, toes, fingernails, and toenails. All findings within normal limits unless otherwise noted below.      Assessment/Plan   1. Angioma of skin  Scattered cherry-red papule(s).    A cherry hemangioma is a small macule (small, flat, smooth area) or papule (small, solid bump) formed from an overgrowth of tiny blood vessels in the skin. Cherry hemangiomas are characteristically red or purplish in color. They often first appear in middle adulthood and usually increase in number with age. Cherry hemangiomas are noncancerous (benign) and are common in adults.    The present appearance of the lesion is not worrisome but it should continue to be observed and testing/treatment may be warranted if change occurs.    2. Benign nevus  Scattered, uniform and benign-appearing, regular brown melanocytic papules and macules.    4 x 4 mm Left scapula has a irregular shape tan brown macule with irregular placed globules with global tan homogenous reticular global pattern.   Mid back has a 4 x 4 mm reticular brown homogenous pattern with irregular globules noted.   Lower mid back has a 5 x 4 mm tan brown macule  interrupted reticular homogenous pattern and irregular globules. No dermoscopic or quantitative changes noted on exam.    The present appearance of the lesion is not worrisome but it should continue to be observed and testing/treatment may be warranted if change occurs.    Compared to baseline photos available and measurements, no change noted.     3. Seborrheic keratosis  Stuck on verrucous, tan-brown papules and plaques.      Seborrheic keratoses are common noncancerous (benign) growths of unknown cause seen in adults due to a thickening of an area of the top skin layer. Seborrheic keratoses may appear as if they are stuck on to the skin. They have distinct borders, and they may appear as papules (small, solid bumps) or plaques (solid, raised patches that are bigger than a thumbnail). They may be the same color as your skin, or they may be pink, light brown, darker brown, or very dark brown, or sometimes may appear black.    There is no way to prevent new seborrheic keratoses from forming. Seborrheic keratoses can be removed, but removal is considered a cosmetic issue and is usually not covered by insurance.    PLAN  No treatment is needed unless there is irritation from clothing, such as itching or bleeding.  2.   Some lotions containing alpha hydroxy acids, salicylic acid, or urea may make the areas feel smoother with regular use but will not eliminate them.    4. Skin tag  Left Upper Arm - Posterior  Fleshy, skin-colored sessile and pedunculated papules.     A skin tag (acrochordon) is a common condition that appears as a small, soft skin growth, often on a thin stalk. Skin tags are often found on areas of frequent friction, such as the eyelids, neck, underarms, and groin. They are harmless, but they can sometimes become irritated from rubbing on clothing or jewelry, for example.        5. Lentigo  Scattered tan macules in sun-exposed areas.    A solar lentigo (plural, solar lentigines), also known as a  sun-induced freckle or senile lentigo, is a dark (hyperpigmented) lesion caused by natural or artificial ultraviolet (UV) light. Solar lentigines may be single or multiple. This type of lentigo is different from a simple lentigo (lentigo simplex) because it is caused by exposure to UV light. Solar lentigines are benign, but they do indicate excessive sun exposure, a risk factor for the development of skin cancer.    To prevent solar lentigines, avoid exposure to sunlight in midday (10 AM to 3 PM), wear sun-protective clothing (tightly woven clothes and hats), and apply sunscreen (SPF 30 UVA and UVB block).    The present appearance of the lesion is not worrisome but it should continue to be observed and testing/treatment may be warranted if change occurs.    6. Skin changes due to chronic exposure to nonionizing radiation  Actinic changes in the form of freckles, lentigines and hyper/hypopigmentation     ABCDEs of melanoma and atypical moles were discussed with the patient.    Patient was instructed to perform monthly self skin examination.  We recommended that the patient have regular full skin exams given an increased risk of subsequent skin cancers.    The patient was instructed to use sun protective behaviors including use of broad spectrum sunscreens and sun protective clothing to reduce risk of skin cancers.    Warning signs of non-melanoma skin cancer discussed.    7. Sebaceous hyperplasia of face (3)  Mid Forehead (3)  Small yellow, lobulated papules with a central dell.    Sebaceous hyperplasia is a common harmless enlargement of the skin oil glands.    Many types of treatment can remove the lesions, with a small risk of leaving scars:  Burning (cautery)  Freezing (cryosurgery)  Applying topical chemicals  Applying a drug activated by light (photodynamic therapy)  Laser treatment  Cutting out the lesions (excision)    The present appearance of the lesion is not worrisome but it should continue to be  observed and testing/treatment may be warranted if change occurs    -Possible side effects of liquid nitrogen treatment reviewed including formation of blisters, crusting, tenderness, scar, and discoloration which may be permanent.  -Patient advised to return the office for re-evaluation if the treated lesion(s) do not resolve within 4-6 weeks. Patient verbalizes understanding.  -Wound care instructions provided for patient to follow.             Return to clinic in 1 year for skin check/follow up or sooner if needed

## 2024-10-16 NOTE — PATIENT INSTRUCTIONS

## 2024-10-28 PROCEDURE — RXMED WILLOW AMBULATORY MEDICATION CHARGE

## 2024-10-29 ENCOUNTER — PHARMACY VISIT (OUTPATIENT)
Dept: PHARMACY | Facility: CLINIC | Age: 59
End: 2024-10-29
Payer: COMMERCIAL

## 2024-10-29 PROCEDURE — RXMED WILLOW AMBULATORY MEDICATION CHARGE

## 2024-10-30 ENCOUNTER — APPOINTMENT (OUTPATIENT)
Dept: PODIATRY | Facility: CLINIC | Age: 59
End: 2024-10-30
Payer: COMMERCIAL

## 2024-11-28 ENCOUNTER — TELEPHONE (OUTPATIENT)
Dept: ENDOCRINOLOGY | Facility: CLINIC | Age: 59
End: 2024-11-28
Payer: COMMERCIAL

## 2024-11-29 NOTE — TELEPHONE ENCOUNTER
I returned his call. Tandem t:Slim failed and will be replaced in 2 days.  Today is Thanksgiving. He has Relion Novolin N which is . He can get a Tresiba pen from his daughter. Advised to take 24 units tonight and tomorrow, resume pump Saturday evening.

## 2024-12-12 DIAGNOSIS — E10.9 TYPE 1 DIABETES MELLITUS WITHOUT COMPLICATION: ICD-10-CM

## 2024-12-13 RX ORDER — INSULIN LISPRO 100 [IU]/ML
INJECTION, SOLUTION INTRAVENOUS; SUBCUTANEOUS
Qty: 120 ML | Refills: 3 | Status: SHIPPED | OUTPATIENT
Start: 2024-12-13

## 2024-12-16 ENCOUNTER — PHARMACY VISIT (OUTPATIENT)
Dept: PHARMACY | Facility: CLINIC | Age: 59
End: 2024-12-16
Payer: COMMERCIAL

## 2024-12-16 PROCEDURE — RXMED WILLOW AMBULATORY MEDICATION CHARGE

## 2024-12-20 ENCOUNTER — APPOINTMENT (OUTPATIENT)
Dept: PHARMACY | Facility: HOSPITAL | Age: 59
End: 2024-12-20
Payer: COMMERCIAL

## 2024-12-20 DIAGNOSIS — E10.9 TYPE 1 DIABETES MELLITUS WITHOUT COMPLICATION: ICD-10-CM

## 2024-12-20 NOTE — PROGRESS NOTES
"[facility] ([facility])    Emiliano Walker \"Lobito\" is a 59 y.o. male was contacted by Clinical Pharmacy Team to complete a comprehensive medication review (CMR) with a pharmacist as part of the [employer] diabetes program.      No Known Allergies    [facility]  [address]  [facility] [address]  Phone: [phone] Fax: [phone]    [address]  [address] [facility] [address]  Phone: [phone] Fax: [phone]        LAB  Lab Results   Component Value Date    BILITOT 0.7 06/03/2024    CALCIUM 9.3 06/03/2024    CO2 27 06/03/2024     06/03/2024    CREATININE 0.87 06/03/2024    GLUCOSE 137 (H) 06/03/2024    ALKPHOS 83 06/03/2024    K 4.1 06/03/2024    PROT 6.2 (L) 06/03/2024     06/03/2024    AST 17 06/03/2024    ALT 19 06/03/2024    BUN 12 06/03/2024    ANIONGAP 11 06/03/2024    ALBUMIN 4.2 06/03/2024    LIPASE <3 (L) 04/09/2024    GFRMALE >90 06/05/2023     Lab Results   Component Value Date    TRIG 58 06/03/2024    CHOL 138 06/03/2024    LDLCALC 78 06/03/2024    HDL 48.8 06/03/2024     Lab Results   Component Value Date    HGBA1C 6.2 (H) 06/03/2024       Current Outpatient Medications on File Prior to Visit   Medication Sig Dispense Refill    ascorbic acid (Vitamin C) 250 mg tablet Take 1 tablet (250 mg) by mouth once daily.      aspirin 81 mg EC tablet Take 1 tablet (81 mg) by mouth once daily.      atorvastatin (Lipitor) 20 mg tablet TAKE 1 TABLET BY MOUTH ONE TIME DAILY 90 tablet 3    cholecalciferol (Vitamin D3) 50 MCG (2000 UT) tablet Take 1 tablet (50 mcg) by mouth once daily.      Dexcom G7 Sensor device For continuous glucose monitoring.  Change every 10 days. 9 each 3    insulin lispro (HumaLOG U-100 Insulin) 100 unit/mL injection Continuous subcutaneous insulin pump infusion, 120 units per day. 120 mL 3    multivitamin (Daily Multi-Vitamin) " tablet Take 1 tablet by mouth once daily.      sildenafil (Viagra) 100 mg tablet TAKE 1 TABLET BY MOUTH ONE TIME DAILY AS NEEDED 10 tablet 3     No current facility-administered medications on file prior to visit.        CURRENT DIABETES PHARMACOTHERAPY  - insulin lispro continuous subcutaneous insulin pump infusion, 120 units per day  - Dexcom G7     SECONDARY PREVENTION  - Statin? yes atorvastatin  - ACE-I/ARB? no    ASSESSMENT/PLAN:  - Patient enrolled in  Employee diabetes program for $0 co-pays on diabetes medications/supplies. Enrollment should be active in 2-4 weeks   - Requested [facility] enrollment date: 12/20/24  - PharmD Management Level: 1  -  Pharmacy fill location:  [facility]    Problem List Items Addressed This Visit       Type 1 diabetes mellitus        Follow up: 11 months    Continue all meds under the continuation of care with the referring provider and clinical pharmacy team.    Luis Carlos Mendoza, PharmD     Patient/Caregiver was informed they may decline to participate or withdraw from participation in pharmacy services at any time.

## 2025-01-06 ENCOUNTER — APPOINTMENT (OUTPATIENT)
Dept: ENDOCRINOLOGY | Facility: CLINIC | Age: 60
End: 2025-01-06
Payer: COMMERCIAL

## 2025-01-06 VITALS
DIASTOLIC BLOOD PRESSURE: 75 MMHG | HEART RATE: 65 BPM | BODY MASS INDEX: 35.3 KG/M2 | WEIGHT: 290 LBS | SYSTOLIC BLOOD PRESSURE: 121 MMHG

## 2025-01-06 DIAGNOSIS — E10.9 TYPE 1 DIABETES MELLITUS WITHOUT COMPLICATION: Primary | ICD-10-CM

## 2025-01-06 DIAGNOSIS — Z96.41 INSULIN PUMP STATUS: ICD-10-CM

## 2025-01-06 LAB — POC HEMOGLOBIN A1C: 6.2 % (ref 4.2–6.5)

## 2025-01-06 PROCEDURE — 99214 OFFICE O/P EST MOD 30 MIN: CPT | Performed by: INTERNAL MEDICINE

## 2025-01-06 PROCEDURE — 95251 CONT GLUC MNTR ANALYSIS I&R: CPT | Performed by: INTERNAL MEDICINE

## 2025-01-06 PROCEDURE — 83036 HEMOGLOBIN GLYCOSYLATED A1C: CPT | Performed by: INTERNAL MEDICINE

## 2025-01-06 PROCEDURE — 1036F TOBACCO NON-USER: CPT | Performed by: INTERNAL MEDICINE

## 2025-01-06 PROCEDURE — 3074F SYST BP LT 130 MM HG: CPT | Performed by: INTERNAL MEDICINE

## 2025-01-06 PROCEDURE — 3078F DIAST BP <80 MM HG: CPT | Performed by: INTERNAL MEDICINE

## 2025-01-06 ASSESSMENT — ENCOUNTER SYMPTOMS
COUGH: 0
SORE THROAT: 0
POLYDIPSIA: 0
VOMITING: 0
APPETITE CHANGE: 0
CONSTIPATION: 0
FEVER: 0
NERVOUS/ANXIOUS: 0
HEADACHES: 0
DIARRHEA: 0
ABDOMINAL PAIN: 0
ARTHRALGIAS: 0
SHORTNESS OF BREATH: 0
FREQUENCY: 0
NAUSEA: 0

## 2025-01-06 NOTE — LETTER
"January 6, 2025     Marina Martinez, APRN-CNP  [address]  [facility]  [facility] [address]    Patient: Lobito Walker   YOB: 1965   Date of Visit: 1/6/2025       Dear Dr. Marina Martinez, APRN-CNP:    Thank you for referring Lobito Walker to me for evaluation. Below are my notes for this consultation.  If you have questions, please do not hesitate to call me. I look forward to following your patient along with you.       Sincerely,     Tyrell Bates MD      CC: No Recipients  ______________________________________________________________________________________    History Of Present Illness  Emiliano Walker \"Bryn" is a 59 y.o. 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), Benign neoplasm, unspecified site (10/10/2017), Disorder of the skin and subcutaneous tissue, unspecified (10/05/2015), Hyperlipidemia, unspecified (06/21/2022), Other conditions influencing health status (10/10/2017), Other hypertrophic disorders of the skin (10/05/2015), Otitis media, unspecified, right ear (03/12/2021), Pain in left foot (10/29/2018), Pain in left leg (07/13/2019), Pain in unspecified wrist (12/26/2020), Personal history of diseases of the skin and subcutaneous tissue (08/26/2013), Personal history of other diseases of the circulatory system (12/29/2015), Personal history of other diseases of the respiratory system (07/13/2019), " Personal history of other specified conditions (10/10/2017), Polyp of colon (10/10/2017), Superficial mycosis, unspecified (06/03/2020), Type 1 diabetes mellitus without complications (12/19/2022), Unspecified acute noninfective otitis externa, right ear (04/02/2021), Unspecified injury of right wrist, hand and finger(s), initial encounter (12/28/2020), Unspecified injury of unspecified wrist, hand and finger(s), initial encounter, and Unspecified injury of unspecified wrist, hand and finger(s), initial encounter.    Surgical History  He has a past surgical history that includes Other surgical history (08/26/2013); Colonoscopy (01/15/2019); Other surgical history (06/21/2022); and Hernia repair (April 10, 2024).     Social History  He reports that he has never smoked. He has never been exposed to tobacco smoke. He has never used smokeless tobacco. He reports that he does not drink alcohol and does not use drugs.    Family History  Family History   Problem Relation Name Age of Onset   • Other (bypass) Father     • Colon cancer Maternal Grandmother     • Colon cancer Paternal Grandmother Cristina        Medications  Current Outpatient Medications   Medication Instructions   • ascorbic acid (VITAMIN C) 250 mg, Daily   • aspirin 81 mg, Daily   • atorvastatin (Lipitor) 20 mg tablet TAKE 1 TABLET BY MOUTH ONE TIME DAILY   • cholecalciferol (VITAMIN D3) 50 mcg, Daily   • Dexcom G7 Sensor device For continuous glucose monitoring.  Change every 10 days.   • insulin lispro (HumaLOG U-100 Insulin) 100 unit/mL injection Continuous subcutaneous insulin pump infusion, 120 units per day.   • multivitamin (Daily Multi-Vitamin) tablet 1 tablet, Daily   • sildenafil (Viagra) 100 mg tablet TAKE 1 TABLET BY MOUTH ONE TIME DAILY AS NEEDED       Allergies  Patient has no known allergies.    Review of Systems   Constitutional:  Negative for appetite change and fever.   HENT:  Negative for sore throat.         Denies dry mouth   Eyes:   Negative for visual disturbance.   Respiratory:  Negative for cough and shortness of breath.    Cardiovascular:  Negative for chest pain.   Gastrointestinal:  Negative for abdominal pain, constipation, diarrhea, nausea and vomiting.   Endocrine: Negative for polydipsia and polyuria.   Genitourinary:  Negative for frequency.   Musculoskeletal:  Negative for arthralgias.   Skin:  Negative for rash.   Neurological:  Negative for headaches.   Psychiatric/Behavioral:  The patient is not nervous/anxious.          Last Recorded Vitals  Blood pressure 121/75, pulse 65, weight 132 kg (290 lb).    Physical Exam  Constitutional:       General: He is not in acute distress.  HENT:      Head: Normocephalic.      Mouth/Throat:      Mouth: Mucous membranes are moist.   Eyes:      Extraocular Movements: Extraocular movements intact.   Neck:      Thyroid: No thyroid mass or thyromegaly.   Cardiovascular:      Pulses:           Radial pulses are 2+ on the right side and 2+ on the left side.   Musculoskeletal:      Right lower leg: No edema.      Left lower leg: No edema.   Lymphadenopathy:      Cervical: No cervical adenopathy.   Neurological:      Mental Status: He is alert.      Motor: No tremor.   Psychiatric:         Mood and Affect: Affect normal.          Relevant Results  Glucose (mg/dL)   Date Value   06/03/2024 137 (H)   04/10/2024 116 (H)   04/09/2024 113 (H)     POC HEMOGLOBIN A1c (%)   Date Value   01/06/2025 6.2   12/18/2023 6.8 (A)     Hemoglobin A1C (%)   Date Value   06/03/2024 6.2 (H)   06/05/2023 6.0 (A)   08/18/2022 6.4 (A)   02/05/2022 6.5 (A)     Bicarbonate (mmol/L)   Date Value   06/03/2024 27   04/10/2024 24   04/09/2024 27     Urea Nitrogen (mg/dL)   Date Value   06/03/2024 12   04/10/2024 10   04/09/2024 11     Creatinine (mg/dL)   Date Value   06/03/2024 0.87   04/10/2024 0.67   04/09/2024 0.84     Lab Results   Component Value Date    CHOL 138 06/03/2024    CHOL 123 06/05/2023    CHOL 127 08/18/2022     Lab  Results   Component Value Date    HDL 48.8 06/03/2024    HDL 45.3 06/05/2023    HDL 45.6 08/18/2022     Lab Results   Component Value Date    LDLCALC 78 06/03/2024     Lab Results   Component Value Date    TRIG 58 06/03/2024    TRIG 76 06/05/2023    TRIG 60 08/18/2022     Lab Results   Component Value Date    TSH 1.75 06/03/2024       CGM INTERPRETATION:  Model:  DexCom G7  Period reviewed:  14 days    Average blood sugar 155 mg/dL, glucose management indicator 7%    Glucose less than 70 mg/dL equals 2% of time worn  Glucose ranging between 70 to 180 mg/dL represented by 68% of time worn  Glucose ranging greater than 180 mg/dL represented by 30% of time worn    >72 hours of data reviewed in order to inform diabetes treatment plan decision making, patient __ currently at risk for recurrent hypoglycemia safety concerns      IMPRESSION  TYPE 1 DIABETES MELLITUS  INSULIN PUMP STATUS  Excellent overall glucose control  Rapid  A1c 6.2%      RECOMMENDATIONS  Continue current program    Follow up 6 months.  Labs and urine before next appointment, if not done by ELIZABETH Martinez.

## 2025-01-06 NOTE — PROGRESS NOTES
"History Of Present Illness  Emiliano Walker \"Bryn" is a 59 y.o. 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), Benign neoplasm, unspecified site (10/10/2017), Disorder of the skin and subcutaneous tissue, unspecified (10/05/2015), Hyperlipidemia, unspecified (06/21/2022), Other conditions influencing health status (10/10/2017), Other hypertrophic disorders of the skin (10/05/2015), Otitis media, unspecified, right ear (03/12/2021), Pain in left foot (10/29/2018), Pain in left leg (07/13/2019), Pain in unspecified wrist (12/26/2020), Personal history of diseases of the skin and subcutaneous tissue (08/26/2013), Personal history of other diseases of the circulatory system (12/29/2015), Personal history of other diseases of the respiratory system (07/13/2019), Personal history of other specified conditions (10/10/2017), Polyp of colon (10/10/2017), Superficial mycosis, unspecified (06/03/2020), Type 1 diabetes mellitus without complications (12/19/2022), Unspecified acute noninfective otitis externa, right ear (04/02/2021), Unspecified injury of right wrist, hand and finger(s), initial encounter (12/28/2020), Unspecified injury of unspecified wrist, hand and finger(s), initial encounter, and Unspecified injury of unspecified wrist, hand and finger(s), initial encounter.    Surgical History  He has a past surgical history that includes Other surgical history " (08/26/2013); Colonoscopy (01/15/2019); Other surgical history (06/21/2022); and Hernia repair (April 10, 2024).     Social History  He reports that he has never smoked. He has never been exposed to tobacco smoke. He has never used smokeless tobacco. He reports that he does not drink alcohol and does not use drugs.    Family History  Family History   Problem Relation Name Age of Onset    Other (bypass) Father      Colon cancer Maternal Grandmother      Colon cancer Paternal Grandmother Cristina        Medications  Current Outpatient Medications   Medication Instructions    ascorbic acid (VITAMIN C) 250 mg, Daily    aspirin 81 mg, Daily    atorvastatin (Lipitor) 20 mg tablet TAKE 1 TABLET BY MOUTH ONE TIME DAILY    cholecalciferol (VITAMIN D3) 50 mcg, Daily    Dexcom G7 Sensor device For continuous glucose monitoring.  Change every 10 days.    insulin lispro (HumaLOG U-100 Insulin) 100 unit/mL injection Continuous subcutaneous insulin pump infusion, 120 units per day.    multivitamin (Daily Multi-Vitamin) tablet 1 tablet, Daily    sildenafil (Viagra) 100 mg tablet TAKE 1 TABLET BY MOUTH ONE TIME DAILY AS NEEDED       Allergies  Patient has no known allergies.    Review of Systems   Constitutional:  Negative for appetite change and fever.   HENT:  Negative for sore throat.         Denies dry mouth   Eyes:  Negative for visual disturbance.   Respiratory:  Negative for cough and shortness of breath.    Cardiovascular:  Negative for chest pain.   Gastrointestinal:  Negative for abdominal pain, constipation, diarrhea, nausea and vomiting.   Endocrine: Negative for polydipsia and polyuria.   Genitourinary:  Negative for frequency.   Musculoskeletal:  Negative for arthralgias.   Skin:  Negative for rash.   Neurological:  Negative for headaches.   Psychiatric/Behavioral:  The patient is not nervous/anxious.          Last Recorded Vitals  Blood pressure 121/75, pulse 65, weight 132 kg (290 lb).    Physical Exam  Constitutional:        General: He is not in acute distress.  HENT:      Head: Normocephalic.      Mouth/Throat:      Mouth: Mucous membranes are moist.   Eyes:      Extraocular Movements: Extraocular movements intact.   Neck:      Thyroid: No thyroid mass or thyromegaly.   Cardiovascular:      Pulses:           Radial pulses are 2+ on the right side and 2+ on the left side.   Musculoskeletal:      Right lower leg: No edema.      Left lower leg: No edema.   Lymphadenopathy:      Cervical: No cervical adenopathy.   Neurological:      Mental Status: He is alert.      Motor: No tremor.   Psychiatric:         Mood and Affect: Affect normal.          Relevant Results  Glucose (mg/dL)   Date Value   06/03/2024 137 (H)   04/10/2024 116 (H)   04/09/2024 113 (H)     POC HEMOGLOBIN A1c (%)   Date Value   01/06/2025 6.2   12/18/2023 6.8 (A)     Hemoglobin A1C (%)   Date Value   06/03/2024 6.2 (H)   06/05/2023 6.0 (A)   08/18/2022 6.4 (A)   02/05/2022 6.5 (A)     Bicarbonate (mmol/L)   Date Value   06/03/2024 27   04/10/2024 24   04/09/2024 27     Urea Nitrogen (mg/dL)   Date Value   06/03/2024 12   04/10/2024 10   04/09/2024 11     Creatinine (mg/dL)   Date Value   06/03/2024 0.87   04/10/2024 0.67   04/09/2024 0.84     Lab Results   Component Value Date    CHOL 138 06/03/2024    CHOL 123 06/05/2023    CHOL 127 08/18/2022     Lab Results   Component Value Date    HDL 48.8 06/03/2024    HDL 45.3 06/05/2023    HDL 45.6 08/18/2022     Lab Results   Component Value Date    LDLCALC 78 06/03/2024     Lab Results   Component Value Date    TRIG 58 06/03/2024    TRIG 76 06/05/2023    TRIG 60 08/18/2022     Lab Results   Component Value Date    TSH 1.75 06/03/2024       CGM INTERPRETATION:  Model:  DexCom G7  Period reviewed:  14 days    Average blood sugar 155 mg/dL, glucose management indicator 7%    Glucose less than 70 mg/dL equals 2% of time worn  Glucose ranging between 70 to 180 mg/dL represented by 68% of time worn  Glucose ranging greater than  180 mg/dL represented by 30% of time worn    >72 hours of data reviewed in order to inform diabetes treatment plan decision making, patient __ currently at risk for recurrent hypoglycemia safety concerns      IMPRESSION  TYPE 1 DIABETES MELLITUS  INSULIN PUMP STATUS  Excellent overall glucose control  Rapid  A1c 6.2%      RECOMMENDATIONS  Continue current program    Follow up 6 months.  Labs and urine before next appointment, if not done by ELIZABETH Martinez.

## 2025-01-06 NOTE — PATIENT INSTRUCTIONS
A1c 6.2%      RECOMMENDATIONS  Continue current program    Follow up 6 months.  Labs and urine before next appointment, if not done by ELIZABETH Martinez.

## 2025-01-25 DIAGNOSIS — E78.2 MIXED HYPERLIPIDEMIA: ICD-10-CM

## 2025-01-27 PROCEDURE — RXMED WILLOW AMBULATORY MEDICATION CHARGE

## 2025-01-27 RX ORDER — ATORVASTATIN CALCIUM 20 MG/1
20 TABLET, FILM COATED ORAL DAILY
Qty: 90 TABLET | Refills: 3 | Status: SHIPPED | OUTPATIENT
Start: 2025-01-27 | End: 2026-01-27

## 2025-01-28 ENCOUNTER — PHARMACY VISIT (OUTPATIENT)
Dept: PHARMACY | Facility: CLINIC | Age: 60
End: 2025-01-28
Payer: COMMERCIAL

## 2025-01-28 PROCEDURE — RXMED WILLOW AMBULATORY MEDICATION CHARGE

## 2025-03-06 ENCOUNTER — PHARMACY VISIT (OUTPATIENT)
Dept: PHARMACY | Facility: CLINIC | Age: 60
End: 2025-03-06
Payer: COMMERCIAL

## 2025-03-09 DIAGNOSIS — N52.9 ERECTILE DYSFUNCTION, UNSPECIFIED ERECTILE DYSFUNCTION TYPE: ICD-10-CM

## 2025-03-10 ENCOUNTER — PHARMACY VISIT (OUTPATIENT)
Dept: PHARMACY | Facility: CLINIC | Age: 60
End: 2025-03-10
Payer: COMMERCIAL

## 2025-03-10 PROCEDURE — RXMED WILLOW AMBULATORY MEDICATION CHARGE

## 2025-03-10 RX ORDER — SILDENAFIL 100 MG/1
100 TABLET, FILM COATED ORAL DAILY PRN
Qty: 10 TABLET | Refills: 3 | Status: SHIPPED | OUTPATIENT
Start: 2025-03-10 | End: 2026-03-10

## 2025-04-01 ENCOUNTER — APPOINTMENT (OUTPATIENT)
Dept: PODIATRY | Facility: CLINIC | Age: 60
End: 2025-04-01
Payer: COMMERCIAL

## 2025-04-01 DIAGNOSIS — L60.1 ONYCHOLYSIS: ICD-10-CM

## 2025-04-01 DIAGNOSIS — E10.9 TYPE 1 DIABETES MELLITUS WITHOUT COMPLICATION: Primary | ICD-10-CM

## 2025-04-01 PROCEDURE — 99213 OFFICE O/P EST LOW 20 MIN: CPT | Performed by: PODIATRIST

## 2025-04-01 PROCEDURE — 1036F TOBACCO NON-USER: CPT | Performed by: PODIATRIST

## 2025-04-01 NOTE — PROGRESS NOTES
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    Acquired plantar porokeratosis    Benign neoplasm, unspecified site 10/10/2017    Tubular adenoma    Disorder of the skin and subcutaneous tissue, unspecified 10/05/2015    Skin lesion    Hyperlipidemia, 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/21/2022    Hyperlipidemia    Other conditions influencing health status 10/10/2017    Weight loss, intentional    Other hypertrophic disorders of the skin 10/05/2015    Skin tag    Otitis media, unspecified, right ear 03/12/2021    Right acute otitis media    Pain in left foot 10/29/2018    Left foot pain    Pain in left leg 07/13/2019    Pain of left lower extremity    Pain in unspecified wrist 12/26/2020    Wrist pain    Personal history of diseases of the skin and subcutaneous tissue 08/26/2013    History of local infection of skin and subcutaneous tissue    Personal history of other diseases of the circulatory system 12/29/2015    History of coronary atherosclerosis    Personal history of other diseases of the respiratory system 07/13/2019    History of acute bronchitis    Personal history of other specified conditions 10/10/2017    History of weight loss    Polyp of colon 10/10/2017    Benign colon polyp    Superficial mycosis, 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/03/2020    Fungal dermatitis    Type 1 diabetes mellitus without complications (Multi) 12/19/2022    Type 1 diabetes mellitus    Unspecified acute noninfective otitis externa, right ear 04/02/2021    Acute otitis externa of right ear, unspecified type    Unspecified injury of right wrist, hand and finger(s), initial encounter 12/28/2020    Injury of right hand, initial encounter    Unspecified injury of unspecified wrist, hand and  finger(s), initial encounter     Hand injury    Unspecified injury of unspecified wrist, hand and finger(s), initial encounter     Finger injury       Medications and Allergies have been reviewed.    Review Of Systems:  GENERAL: No weight loss, malaise or fevers.  HEENT: Negative for frequent or significant headaches,   RESPIRATORY: Negative for cough, wheezing or shortness of breath.  CARDIOVASCULAR: Negative for chest pain, leg swelling or palpitations.    Examination of Both Lower Extremities:   Vascular exam: Dorsalis pedis and Posterior Tibial pulses palpable 2/4 bilateral. Capillary refill time less than 3 seconds b/l. Hair growth noted to digits. No edema noted. Skin temp warm to warm from proximal to distal b/l. +varicosities noted.     Neurologic exam: Vibratory, protective and proprioception intact b/l. No neurologic deficits noted.      Musculoskeletal exam: Muscle strength 5/5 for all major muscle groups tested in the lower extremity b/l. No gross deformities noted b/l.      Dermatologic exam: Left hallux not attached to base distallly. Debrided loose nail  No SOI noted  Debrided remaining nails  No HP tissue noted     1. Type 1 diabetes mellitus without complication (Multi)        2. Onycholysis        3. Encounter for diabetic foot exam (Multi)          Patient examined and evaluated.   Patient educated on proper diabetic foot care and education dispensed.  A1C revd 6.2  Debrided nails  Left nail debrided   Fu 12 mos for Dm exam  Low pedal risk noted  Sooner if any new prob arise  Patient was in agreement to this plan. All questions answered.      Kelli John DPM  [phone]  Option 2  Fax: [phone]

## 2025-05-01 PROCEDURE — RXMED WILLOW AMBULATORY MEDICATION CHARGE

## 2025-05-02 ENCOUNTER — PHARMACY VISIT (OUTPATIENT)
Dept: PHARMACY | Facility: CLINIC | Age: 60
End: 2025-05-02
Payer: COMMERCIAL

## 2025-05-02 PROCEDURE — RXMED WILLOW AMBULATORY MEDICATION CHARGE

## 2025-06-06 PROCEDURE — RXMED WILLOW AMBULATORY MEDICATION CHARGE

## 2025-06-09 ENCOUNTER — PHARMACY VISIT (OUTPATIENT)
Dept: PHARMACY | Facility: CLINIC | Age: 60
End: 2025-06-09
Payer: COMMERCIAL

## 2025-06-09 PROCEDURE — RXMED WILLOW AMBULATORY MEDICATION CHARGE

## 2025-06-15 LAB
ALBUMIN SERPL-MCNC: 4 G/DL (ref 3.6–5.1)
ALBUMIN/CREAT UR: NORMAL
ALP SERPL-CCNC: 69 U/L (ref 35–144)
ALT SERPL-CCNC: 29 U/L (ref 9–46)
ANION GAP SERPL CALCULATED.4IONS-SCNC: 8 MMOL/L (CALC) (ref 7–17)
AST SERPL-CCNC: 29 U/L (ref 10–35)
BILIRUB SERPL-MCNC: 0.9 MG/DL (ref 0.2–1.2)
BUN SERPL-MCNC: 13 MG/DL (ref 7–25)
CALCIUM SERPL-MCNC: 9.1 MG/DL (ref 8.6–10.3)
CHLORIDE SERPL-SCNC: 106 MMOL/L (ref 98–110)
CHOLEST SERPL-MCNC: 113 MG/DL
CHOLEST/HDLC SERPL: 2.3 (CALC)
CO2 SERPL-SCNC: 27 MMOL/L (ref 20–32)
CREAT SERPL-MCNC: 0.8 MG/DL (ref 0.7–1.35)
CREAT UR-MCNC: NORMAL MG/DL
EGFRCR SERPLBLD CKD-EPI 2021: 101 ML/MIN/1.73M2
ERYTHROCYTE [DISTWIDTH] IN BLOOD BY AUTOMATED COUNT: 12.4 % (ref 11–15)
EST. AVERAGE GLUCOSE BLD GHB EST-MCNC: 131 MG/DL
EST. AVERAGE GLUCOSE BLD GHB EST-SCNC: 7.3 MMOL/L
GLUCOSE SERPL-MCNC: 83 MG/DL (ref 65–139)
HBA1C MFR BLD: 6.2 %
HCT VFR BLD AUTO: 42.8 % (ref 38.5–50)
HDLC SERPL-MCNC: 49 MG/DL
HGB BLD-MCNC: 14.1 G/DL (ref 13.2–17.1)
LDLC SERPL CALC-MCNC: 51 MG/DL (CALC)
MCH RBC QN AUTO: 31.2 PG (ref 27–33)
MCHC RBC AUTO-ENTMCNC: 32.9 G/DL (ref 32–36)
MCV RBC AUTO: 94.7 FL (ref 80–100)
MICROALBUMIN UR-MCNC: NORMAL
NONHDLC SERPL-MCNC: 64 MG/DL (CALC)
PLATELET # BLD AUTO: 203 THOUSAND/UL (ref 140–400)
PMV BLD REES-ECKER: 9.8 FL (ref 7.5–12.5)
POTASSIUM SERPL-SCNC: 4.3 MMOL/L (ref 3.5–5.3)
PROT SERPL-MCNC: 5.9 G/DL (ref 6.1–8.1)
RBC # BLD AUTO: 4.52 MILLION/UL (ref 4.2–5.8)
SODIUM SERPL-SCNC: 141 MMOL/L (ref 135–146)
TRIGL SERPL-MCNC: 44 MG/DL
TSH SERPL-ACNC: 1.87 MIU/L (ref 0.4–4.5)
WBC # BLD AUTO: 5.3 THOUSAND/UL (ref 3.8–10.8)

## 2025-06-16 LAB
ALBUMIN SERPL-MCNC: 4 G/DL (ref 3.6–5.1)
ALBUMIN/CREAT UR: NORMAL MG/G CREAT
ALP SERPL-CCNC: 69 U/L (ref 35–144)
ALT SERPL-CCNC: 29 U/L (ref 9–46)
ANION GAP SERPL CALCULATED.4IONS-SCNC: 8 MMOL/L (CALC) (ref 7–17)
AST SERPL-CCNC: 29 U/L (ref 10–35)
BILIRUB SERPL-MCNC: 0.9 MG/DL (ref 0.2–1.2)
BUN SERPL-MCNC: 13 MG/DL (ref 7–25)
CALCIUM SERPL-MCNC: 9.1 MG/DL (ref 8.6–10.3)
CHLORIDE SERPL-SCNC: 106 MMOL/L (ref 98–110)
CHOLEST SERPL-MCNC: 113 MG/DL
CHOLEST/HDLC SERPL: 2.3 (CALC)
CO2 SERPL-SCNC: 27 MMOL/L (ref 20–32)
CREAT SERPL-MCNC: 0.8 MG/DL (ref 0.7–1.35)
CREAT UR-MCNC: 71 MG/DL (ref 20–320)
EGFRCR SERPLBLD CKD-EPI 2021: 101 ML/MIN/1.73M2
ERYTHROCYTE [DISTWIDTH] IN BLOOD BY AUTOMATED COUNT: 12.4 % (ref 11–15)
EST. AVERAGE GLUCOSE BLD GHB EST-MCNC: 131 MG/DL
EST. AVERAGE GLUCOSE BLD GHB EST-SCNC: 7.3 MMOL/L
GLUCOSE SERPL-MCNC: 83 MG/DL (ref 65–139)
HBA1C MFR BLD: 6.2 %
HCT VFR BLD AUTO: 42.8 % (ref 38.5–50)
HDLC SERPL-MCNC: 49 MG/DL
HGB BLD-MCNC: 14.1 G/DL (ref 13.2–17.1)
LDLC SERPL CALC-MCNC: 51 MG/DL (CALC)
MCH RBC QN AUTO: 31.2 PG (ref 27–33)
MCHC RBC AUTO-ENTMCNC: 32.9 G/DL (ref 32–36)
MCV RBC AUTO: 94.7 FL (ref 80–100)
MICROALBUMIN UR-MCNC: <0.2 MG/DL
NONHDLC SERPL-MCNC: 64 MG/DL (CALC)
PLATELET # BLD AUTO: 203 THOUSAND/UL (ref 140–400)
PMV BLD REES-ECKER: 9.8 FL (ref 7.5–12.5)
POTASSIUM SERPL-SCNC: 4.3 MMOL/L (ref 3.5–5.3)
PROT SERPL-MCNC: 5.9 G/DL (ref 6.1–8.1)
RBC # BLD AUTO: 4.52 MILLION/UL (ref 4.2–5.8)
SODIUM SERPL-SCNC: 141 MMOL/L (ref 135–146)
TRIGL SERPL-MCNC: 44 MG/DL
TSH SERPL-ACNC: 1.87 MIU/L (ref 0.4–4.5)
WBC # BLD AUTO: 5.3 THOUSAND/UL (ref 3.8–10.8)

## 2025-07-01 ENCOUNTER — APPOINTMENT (OUTPATIENT)
Dept: PRIMARY CARE | Facility: CLINIC | Age: 60
End: 2025-07-01
Payer: COMMERCIAL

## 2025-07-03 ENCOUNTER — APPOINTMENT (OUTPATIENT)
Dept: PRIMARY CARE | Facility: CLINIC | Age: 60
End: 2025-07-03
Payer: COMMERCIAL

## 2025-07-03 VITALS
HEIGHT: 76 IN | DIASTOLIC BLOOD PRESSURE: 75 MMHG | BODY MASS INDEX: 34.05 KG/M2 | HEART RATE: 57 BPM | WEIGHT: 279.6 LBS | OXYGEN SATURATION: 96 % | SYSTOLIC BLOOD PRESSURE: 133 MMHG

## 2025-07-03 DIAGNOSIS — Z00.00 ANNUAL PHYSICAL EXAM: Primary | ICD-10-CM

## 2025-07-03 DIAGNOSIS — E10.9 TYPE 1 DIABETES MELLITUS WITHOUT COMPLICATION: ICD-10-CM

## 2025-07-03 DIAGNOSIS — Z23 NEED FOR VACCINATION: ICD-10-CM

## 2025-07-03 DIAGNOSIS — Z96.41 INSULIN PUMP STATUS: ICD-10-CM

## 2025-07-03 DIAGNOSIS — N52.2 DRUG-INDUCED ERECTILE DYSFUNCTION: ICD-10-CM

## 2025-07-03 DIAGNOSIS — E78.2 MIXED HYPERLIPIDEMIA: ICD-10-CM

## 2025-07-03 PROCEDURE — 99396 PREV VISIT EST AGE 40-64: CPT | Performed by: NURSE PRACTITIONER

## 2025-07-03 PROCEDURE — 3008F BODY MASS INDEX DOCD: CPT | Performed by: NURSE PRACTITIONER

## 2025-07-03 PROCEDURE — 3044F HG A1C LEVEL LT 7.0%: CPT | Performed by: NURSE PRACTITIONER

## 2025-07-03 PROCEDURE — 3078F DIAST BP <80 MM HG: CPT | Performed by: NURSE PRACTITIONER

## 2025-07-03 PROCEDURE — 90471 IMMUNIZATION ADMIN: CPT | Performed by: NURSE PRACTITIONER

## 2025-07-03 PROCEDURE — 1036F TOBACCO NON-USER: CPT | Performed by: NURSE PRACTITIONER

## 2025-07-03 PROCEDURE — 3075F SYST BP GE 130 - 139MM HG: CPT | Performed by: NURSE PRACTITIONER

## 2025-07-03 PROCEDURE — 90677 PCV20 VACCINE IM: CPT | Performed by: NURSE PRACTITIONER

## 2025-07-03 ASSESSMENT — PATIENT HEALTH QUESTIONNAIRE - PHQ9
SUM OF ALL RESPONSES TO PHQ9 QUESTIONS 1 AND 2: 0
1. LITTLE INTEREST OR PLEASURE IN DOING THINGS: NOT AT ALL
2. FEELING DOWN, DEPRESSED OR HOPELESS: NOT AT ALL

## 2025-07-03 ASSESSMENT — PROMIS GLOBAL HEALTH SCALE
CARRYOUT_SOCIAL_ACTIVITIES: GOOD
EMOTIONAL_PROBLEMS: NEVER
RATE_SOCIAL_SATISFACTION: GOOD
RATE_QUALITY_OF_LIFE: VERY GOOD
RATE_MENTAL_HEALTH: GOOD
RATE_PHYSICAL_HEALTH: GOOD
RATE_AVERAGE_PAIN: 0
RATE_GENERAL_HEALTH: GOOD
CARRYOUT_PHYSICAL_ACTIVITIES: COMPLETELY

## 2025-07-03 NOTE — PROGRESS NOTES
"Subjective   Reason for Visit: Emiliano Walker is an 59 y.o. male here for a CPE     Chief Complaint   Patient presents with    Annual Exam     Emiliano Walker is a 59 y.o. male is here today for a CPE. Patient reports No questions or concerns at this time.           HPI  Emiliano \"Bryn" is a 58 yo est male presenting today for Annual CPE  LOV: JUNE 2024     Dx: T1DM, OBESITY, ATOPIC DERM     Pt is followed by:  Maria Fernanda COLON   Derm: ARNULFO   POD: MILLIMAN    Pt reports feeling well  No acute c/o  Saw Dr. Jana Duggan last month, had FBW/A1C completed  Eye exam current   No refills requested  Needs Prevnar 20 today     #CPE   Occupation: FT at [employer]   May retire in 2 yrs     Do you take any herbs or supplements that were not prescribed by a doctor? MVI, Vit C, D  Are you taking aspirin daily? yes    Colon cancer screening: UTD  PSA: 2024    Fasting blood work: UTD, June 2025  Last eye exam: 2024  Last dental Exam: every 6 mos  Exercise: regularly   Mood: no concerns   Sleep: no concerns   Vaccines: needs Prevnar 20     #T1DM  Onset: age 21  Rx: Insulin pump status  Tandem t:Slim with Control-IQ  Insulin:  Novolog  Manual basal 24 unit/day  1 unit/h  Dexcom G7  Last eye exam: 12/2024  A1C= 6.2% June 2025  ACE/ARB: No  STATIN: Yes       No Known Allergies    Orders Only on 06/14/2025   Component Date Value Ref Range Status    CHOLESTEROL, TOTAL 06/14/2025 113  <200 mg/dL Final    HDL CHOLESTEROL 06/14/2025 49  > OR = 40 mg/dL Final    TRIGLYCERIDES 06/14/2025 44  <150 mg/dL Final    LDL-CHOLESTEROL 06/14/2025 51  mg/dL (calc) Final    Comment: Reference range: <100     Desirable range <100 mg/dL for primary prevention;    <70 mg/dL for patients with CHD or diabetic patients   with > or = 2 CHD risk factors.     LDL-C is now calculated using the Asim   calculation, which is a validated novel method providing   better accuracy than the Friedewald equation in the   estimation of LDL-C.   Rodolfo COLLIER et al. " NATASHA. 2013;310(19): 9715-3913   (http://education.Intentiva.ShipHawk/faq/QTM849)      CHOL/HDLC RATIO 06/14/2025 2.3  <5.0 (calc) Final    NON HDL CHOLESTEROL 06/14/2025 64  <130 mg/dL (calc) Final    Comment: For patients with diabetes plus 1 major ASCVD risk   factor, treating to a non-HDL-C goal of <100 mg/dL   (LDL-C of <70 mg/dL) is considered a therapeutic   option.      GLUCOSE 06/14/2025 83  65 - 139 mg/dL Final    Comment:           Non-fasting reference interval         UREA NITROGEN (BUN) 06/14/2025 13  7 - 25 mg/dL Final    CREATININE 06/14/2025 0.80  0.70 - 1.35 mg/dL Final    EGFR 06/14/2025 101  > OR = 60 mL/min/1.73m2 Final    SODIUM 06/14/2025 141  135 - 146 mmol/L Final    POTASSIUM 06/14/2025 4.3  3.5 - 5.3 mmol/L Final    CHLORIDE 06/14/2025 106  98 - 110 mmol/L Final    CARBON DIOXIDE 06/14/2025 27  20 - 32 mmol/L Final    ELECTROLYTE BALANCE 06/14/2025 8  7 - 17 mmol/L (calc) Final    CALCIUM 06/14/2025 9.1  8.6 - 10.3 mg/dL Final    PROTEIN, TOTAL 06/14/2025 5.9 (L)  6.1 - 8.1 g/dL Final    ALBUMIN 06/14/2025 4.0  3.6 - 5.1 g/dL Final    BILIRUBIN, TOTAL 06/14/2025 0.9  0.2 - 1.2 mg/dL Final    ALKALINE PHOSPHATASE 06/14/2025 69  35 - 144 U/L Final    AST 06/14/2025 29  10 - 35 U/L Final    ALT 06/14/2025 29  9 - 46 U/L Final    CREATININE, RANDOM URINE 06/14/2025 71  20 - 320 mg/dL Final    ALBUMIN, URINE 06/14/2025 <0.2  See Note: mg/dL Final    Comment: Reference Range:    Reference Range  Not established      ALBUMIN/CREATININE RATIO, RANDOM U* 06/14/2025 NOTE  <30 mg/g creat Final    Comment: NOTE: The urine albumin value is less than   0.2 mg/dL therefore we are unable to calculate   excretion and/or creatinine ratio.     The ADA defines abnormalities in albumin  excretion as follows:     Albuminuria Category        Result (mg/g creatinine)     Normal to Mildly increased   <30  Moderately increased            Severely increased           > OR = 300     The ADA recommends that at  least two of three  specimens collected within a 3-6 month period be  abnormal before considering a patient to be  within a diagnostic category.      WHITE BLOOD CELL COUNT 06/14/2025 5.3  3.8 - 10.8 Thousand/uL Final    RED BLOOD CELL COUNT 06/14/2025 4.52  4.20 - 5.80 Million/uL Final    HEMOGLOBIN 06/14/2025 14.1  13.2 - 17.1 g/dL Final    HEMATOCRIT 06/14/2025 42.8  38.5 - 50.0 % Final    MCV 06/14/2025 94.7  80.0 - 100.0 fL Final    MCH 06/14/2025 31.2  27.0 - 33.0 pg Final    MCHC 06/14/2025 32.9  32.0 - 36.0 g/dL Final    Comment: For adults, a slight decrease in the calculated MCHC  value (in the range of 30 to 32 g/dL) is most likely  not clinically significant; however, it should be  interpreted with caution in correlation with other  red cell parameters and the patient's clinical  condition.      RDW 06/14/2025 12.4  11.0 - 15.0 % Final    PLATELET COUNT 06/14/2025 203  140 - 400 Thousand/uL Final    MPV 06/14/2025 9.8  7.5 - 12.5 fL Final    TSH 06/14/2025 1.87  0.40 - 4.50 mIU/L Final    HEMOGLOBIN A1c 06/14/2025 6.2 (H)  <5.7 % Final    Comment: For someone without known diabetes, a hemoglobin   A1c value between 5.7% and 6.4% is consistent with  prediabetes and should be confirmed with a   follow-up test.     For someone with known diabetes, a value <7%  indicates that their diabetes is well controlled. A1c  targets should be individualized based on duration of  diabetes, age, comorbid conditions, and other  considerations.     This assay result is consistent with an increased risk  of diabetes.     Currently, no consensus exists regarding use of  hemoglobin A1c for diagnosis of diabetes for children.         eAG (mg/dL) 06/14/2025 131  mg/dL Final    eAG (mmol/L) 06/14/2025 7.3  mmol/L Final         Patient Care Team:  VIRY Barksdale as PCP - General (Family Medicine)  Marina B Iliano, APRN-CNP as PCP - Employee [employer] PCP     Review of Systems  ROS was completed and all systems are negative  "with the exception of what was noted in the the HPI.       Objective     Last Recorded Vitals:  /75   Pulse 57   Ht 1.93 m (6' 4\")   Wt 127 kg (279 lb 9.6 oz)   SpO2 96%   BMI 34.03 kg/m²       Surgical History[1]    Current Outpatient Medications   Medication Instructions    ascorbic acid (VITAMIN C) 250 mg, Daily    aspirin 81 mg, Daily    atorvastatin (Lipitor) 20 mg tablet TAKE 1 TABLET BY MOUTH ONE TIME DAILY    cholecalciferol (VITAMIN D3) 50 mcg, Daily    Dexcom G7 Sensor device For continuous glucose monitoring.  Change every 10 days.    insulin lispro (HumaLOG U-100 Insulin) 100 unit/mL injection Continuous subcutaneous insulin pump infusion, 120 units per day.    multivitamin (Daily Multi-Vitamin) tablet 1 tablet, Daily    sildenafil (Viagra) 100 mg tablet TAKE 1 TABLET BY MOUTH ONE TIME DAILY AS NEEDED         Physical Exam  Vitals reviewed.   HENT:      Right Ear: Tympanic membrane normal.      Left Ear: Tympanic membrane normal.      Mouth/Throat:      Mouth: Mucous membranes are moist.   Eyes:      Conjunctiva/sclera: Conjunctivae normal.   Cardiovascular:      Pulses: Normal pulses.      Heart sounds: Normal heart sounds.   Pulmonary:      Effort: Pulmonary effort is normal.      Breath sounds: Normal breath sounds.   Abdominal:      General: Bowel sounds are normal.   Musculoskeletal:         General: Normal range of motion.   Skin:     General: Skin is warm and dry.   Neurological:      Mental Status: He is alert and oriented to person, place, and time.   Psychiatric:         Mood and Affect: Mood normal.         Thought Content: Thought content normal.         Judgment: Judgment normal.           Assessment & Plan  Annual physical exam  - Counseled on healthy diet and regular exercise  - Fall avoidance information provided  - Personalized prevention plan provided   - Colon UTD  - Vaccines: Prevnar 20 today         Type 1 diabetes mellitus without complication  Followed by Dr. Olga Lidia " Jana  FBW done in June 2025  Last A1C= 6.2% June 2025  Rx: Insulin lispro via pump   Statin: yes  ACE/ARB: no  CGM: Dexcom 6  Diabetic foot exam per Podiatry         Mixed hyperlipidemia         Insulin pump status  Started w/ pump in 2006  Continue current medications and follow-up at least yearly.  Current pump x 1 year     Drug-induced erectile dysfunction  C/W Viagara        Need for vaccination    Orders:    Pneumococcal conjugate vaccine, 20-valent (PREVNAR 20)                  [1]   Past Surgical History:  Procedure Laterality Date    COLONOSCOPY  01/15/2019    Complete Colonoscopy    EYE SURGERY      HERNIA REPAIR  April 10, 2024    OTHER SURGICAL HISTORY  08/26/2013    Wrist Surgery    OTHER SURGICAL HISTORY  06/21/2022    Blepharoplasty

## 2025-07-03 NOTE — ASSESSMENT & PLAN NOTE
Followed by Dr. Jana Duggan  FBW done in June 2025  Last A1C= 6.2% June 2025  Rx: Insulin lispro via pump   Statin: yes  ACE/ARB: no  CGM: Dexcom 6  Diabetic foot exam per Podiatry

## 2025-07-03 NOTE — PATIENT INSTRUCTIONS
Thank you for seeing me today Emiliano CONWAY Aaron, it was a pleasure to see you again!    Today we did your Annual Physical  Exam and discussed the following:     Prevnar 20 today    Continue medications as prescribed    Discussed with patient benefits of a low carbohydrate, low sodium, 5019-2140 calorie diet.    Most healthy diets include plenty of fruits, vegetables, fish and whole grains.  Avoid foods high in unhealthy fats.   Meal plan and avoid skipping meals    1.  Eat 3 meals per day on a regular schedule plus 2 to 3 small snacks     -->  Use My Plate     2.  Measure portions of foods with measuring cups and/or food scale         3.  Eat a lean protein food at each meal- ideas discussed    -->  recommended protein portion for main meal = 6 ounces cooked    -->  avoid processed meats and fast food         4.   Eat 2 to 3 servings of fruit and 5 servings of non-starchy vegetables each day    -->  Fruit:  1 serving equals 1 small fruit, 3/4 cup berries, 1/2 banana    -->  Vegetables:  1 serving equals 1/2 cooked or 1 cup raw         5.  Refer to Mediterranean Diet Nutrition Therapy for foods to choose more often and foods to limit;      6.  Avoid sugar sweetened beverages         7.  Gender / age appropriate Multi Vitamin with minerals         8.  Use online armando or food journal keep monitor calorie and macronutrient intake;       9.  Physical Activity Guidelines:  150 minutes moderate intensity physical activity per week  -->Healthy exercises include brisk walking, bide riding and swimming, at least 30 min everyday.    Drink at least 64 oz of water per day, avoid beverages high in sugar, like sodas.  Beverages like Propel, Vitamin Water Zero, Sobe Lifewater, Crystal Light are good choices too.  Do not consume more than 2 alcoholic beverages per day.     Apps on your phone/tablet can be helpful:  Weight Watchers  Erick Best   My fitness Pal  Lose it     For assistance with scheduling referrals or consultations,  please call [phone] or [phone].    For laboratory, radiology, and other tests, please call [phone] ([phone] for pediatrics).   For laboratory locations, please visit [email or web address]   If you do not get results within 7-10 days, or you have any questions or concerns, please send a message, call the office ([phone]), or return to the office for a follow-up appointment.     For acute/sick visits, if you are unable to get an office visit, you can do a  On Demand Virtual Visit that is accessible via your My Chart account.  For emergencies, call 9-1-1 or go to the nearest Emergency Department.     Please schedule additional appointment(s) to address concern(s) not addressed today.    Please review prescription inserts and published information for possible adverse effects of all medications.     In general, results are discussed over the phone or via  [employer].     You can see your health information, review clinical summaries from office visits & test results online when you follow your health with MY  Chart, a personal health record.   To sign up go to [email or web address].   If you need assistance with signing up or trouble getting into your account call [employer] Patient Line 24/7 at [phone]     RTC ANNUALLY AND AS NEEDED     ~Estefanía Martinez NP

## 2025-07-03 NOTE — ASSESSMENT & PLAN NOTE
- Counseled on healthy diet and regular exercise  - Fall avoidance information provided  - Personalized prevention plan provided   - Colon UTD  - Vaccines: Prevnar 20 today

## 2025-07-07 ENCOUNTER — PHARMACY VISIT (OUTPATIENT)
Dept: PHARMACY | Facility: CLINIC | Age: 60
End: 2025-07-07
Payer: COMMERCIAL

## 2025-07-07 ENCOUNTER — APPOINTMENT (OUTPATIENT)
Dept: ENDOCRINOLOGY | Facility: CLINIC | Age: 60
End: 2025-07-07
Payer: COMMERCIAL

## 2025-07-07 VITALS
BODY MASS INDEX: 33.6 KG/M2 | HEART RATE: 66 BPM | DIASTOLIC BLOOD PRESSURE: 72 MMHG | SYSTOLIC BLOOD PRESSURE: 145 MMHG | WEIGHT: 276 LBS

## 2025-07-07 DIAGNOSIS — E10.9 TYPE 1 DIABETES MELLITUS WITHOUT COMPLICATION: Primary | ICD-10-CM

## 2025-07-07 DIAGNOSIS — Z96.41 INSULIN PUMP STATUS: ICD-10-CM

## 2025-07-07 PROCEDURE — 3077F SYST BP >= 140 MM HG: CPT | Performed by: INTERNAL MEDICINE

## 2025-07-07 PROCEDURE — 3078F DIAST BP <80 MM HG: CPT | Performed by: INTERNAL MEDICINE

## 2025-07-07 PROCEDURE — RXMED WILLOW AMBULATORY MEDICATION CHARGE

## 2025-07-07 PROCEDURE — 95251 CONT GLUC MNTR ANALYSIS I&R: CPT | Performed by: INTERNAL MEDICINE

## 2025-07-07 PROCEDURE — 99214 OFFICE O/P EST MOD 30 MIN: CPT | Performed by: INTERNAL MEDICINE

## 2025-07-07 PROCEDURE — 3044F HG A1C LEVEL LT 7.0%: CPT | Performed by: INTERNAL MEDICINE

## 2025-07-07 PROCEDURE — 1036F TOBACCO NON-USER: CPT | Performed by: INTERNAL MEDICINE

## 2025-07-07 ASSESSMENT — ENCOUNTER SYMPTOMS
CONSTIPATION: 0
SHORTNESS OF BREATH: 0
DIARRHEA: 0
SORE THROAT: 0
APPETITE CHANGE: 0
ABDOMINAL PAIN: 0
NAUSEA: 0
VOMITING: 0
FEVER: 0
NERVOUS/ANXIOUS: 0
FREQUENCY: 0
ARTHRALGIAS: 0
HEADACHES: 0
COUGH: 0
POLYDIPSIA: 0

## 2025-07-07 NOTE — LETTER
"July 7, 2025     Marina Martinez, APRN-CNP  [address]  [facility] [place]  [facility] [address]    Patient: Lobito Walker   YOB: 1965   Date of Visit: 7/7/2025       Dear Dr. Marina Martinez, APRN-CNP:    Thank you for referring Lobito Walker to me for evaluation. Below are my notes for this consultation.  If you have questions, please do not hesitate to call me. I look forward to following your patient along with you.       Sincerely,     Tyrell Bates MD      CC: No Recipients  ______________________________________________________________________________________    History Of Present Illness  Emiliano Walker \"Bryn" is a 59 y.o. male     Duration of type 1 diabetes mellitus:  37 years  Complications:  none     Insulin pump status  Tandem t:Slim with Control-IQ  Insulin:  Novolog     Automated mode:  % (unavailable)  Auto Basal:  Unavailable    Manual basal 24 unit/day  1 unit/h     Bolus  MN-02:30 6.5  2:30-16:30 5  16:30-MN 6.5      Insulin sensitivity factor:  30  Target glucose 110  Active insulin time 5h     Temp target 150 mg/dl during busy work     DexCom G7  Patient is testing glucose 1440 times daily  Records reviewed, on file     Last eye exam:  12/24/24       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), Benign neoplasm, unspecified site (10/10/2017), Disorder of the skin and subcutaneous tissue, unspecified (10/05/2015), Hyperlipidemia, unspecified (06/21/2022), Other conditions influencing health status (10/10/2017), Other hypertrophic disorders of the skin (10/05/2015), Otitis media, unspecified, right ear (03/12/2021), Pain in left foot (10/29/2018), Pain in left leg (07/13/2019), Pain in unspecified wrist (12/26/2020), Personal history of diseases of the skin and subcutaneous tissue (08/26/2013), Personal history of other diseases of the circulatory system (12/29/2015), Personal history of " other diseases of the respiratory system (07/13/2019), Personal history of other specified conditions (10/10/2017), Polyp of colon (10/10/2017), Superficial mycosis, unspecified (06/03/2020), Type 1 diabetes mellitus without complications (12/19/2022), Unspecified acute noninfective otitis externa, right ear (04/02/2021), Unspecified injury of right wrist, hand and finger(s), initial encounter (12/28/2020), Unspecified injury of unspecified wrist, hand and finger(s), initial encounter, and Unspecified injury of unspecified wrist, hand and finger(s), initial encounter.    Surgical History  He has a past surgical history that includes Other surgical history (08/26/2013); Colonoscopy (01/15/2019); Other surgical history (06/21/2022); Hernia repair (April 10, 2024); and Eye surgery.     Social History  He reports that he has never smoked. He has never been exposed to tobacco smoke. He has never used smokeless tobacco. He reports that he does not drink alcohol and does not use drugs.    Family History  Family History[1]    Medications  Current Outpatient Medications   Medication Instructions   • ascorbic acid (VITAMIN C) 250 mg, Daily   • aspirin 81 mg, Daily   • atorvastatin (Lipitor) 20 mg tablet TAKE 1 TABLET BY MOUTH ONE TIME DAILY   • cholecalciferol (VITAMIN D3) 50 mcg, Daily   • Dexcom G7 Sensor device For continuous glucose monitoring.  Change every 10 days.   • insulin lispro (HumaLOG U-100 Insulin) 100 unit/mL injection Continuous subcutaneous insulin pump infusion, 120 units per day.   • multivitamin (Daily Multi-Vitamin) tablet 1 tablet, Daily   • sildenafil (Viagra) 100 mg tablet TAKE 1 TABLET BY MOUTH ONE TIME DAILY AS NEEDED       Allergies  Patient has no known allergies.    Review of Systems   Constitutional:  Negative for appetite change and fever.   HENT:  Negative for sore throat.         Denies dry mouth   Eyes:  Negative for visual disturbance.   Respiratory:  Negative for cough and shortness of  breath.    Cardiovascular:  Negative for chest pain.   Gastrointestinal:  Negative for abdominal pain, constipation, diarrhea, nausea and vomiting.   Endocrine: Negative for polydipsia and polyuria.   Genitourinary:  Negative for frequency.   Musculoskeletal:  Negative for arthralgias.   Skin:  Negative for rash.   Neurological:  Negative for headaches.   Psychiatric/Behavioral:  The patient is not nervous/anxious.          Last Recorded Vitals  Blood pressure 145/72, pulse 66, weight 125 kg (276 lb).    Physical Exam  Constitutional:       General: He is not in acute distress.  HENT:      Head: Normocephalic.      Mouth/Throat:      Mouth: Mucous membranes are moist.   Eyes:      Extraocular Movements: Extraocular movements intact.   Neck:      Thyroid: No thyroid mass or thyromegaly.   Cardiovascular:      Pulses:           Radial pulses are 2+ on the right side and 2+ on the left side.      Comments: Trace ankle edema bilaterally  Musculoskeletal:      Right lower leg: Edema present.      Left lower leg: Edema present.   Lymphadenopathy:      Cervical: No cervical adenopathy.   Neurological:      Mental Status: He is alert.      Motor: No tremor.   Psychiatric:         Mood and Affect: Affect normal.          Relevant Results  GLUCOSE (mg/dL)   Date Value   06/14/2025 83     Glucose (mg/dL)   Date Value   06/03/2024 137 (H)   04/10/2024 116 (H)   04/09/2024 113 (H)     POC HEMOGLOBIN A1c (%)   Date Value   01/06/2025 6.2   12/18/2023 6.8 (A)     HEMOGLOBIN A1c (%)   Date Value   06/14/2025 6.2 (H)     Hemoglobin A1C (%)   Date Value   06/03/2024 6.2 (H)   06/05/2023 6.0 (A)   08/18/2022 6.4 (A)   02/05/2022 6.5 (A)     CARBON DIOXIDE (mmol/L)   Date Value   06/14/2025 27     Bicarbonate (mmol/L)   Date Value   06/03/2024 27   04/10/2024 24   04/09/2024 27     UREA NITROGEN (BUN) (mg/dL)   Date Value   06/14/2025 13     Urea Nitrogen (mg/dL)   Date Value   06/03/2024 12   04/10/2024 10   04/09/2024 11      Creatinine (mg/dL)   Date Value   06/03/2024 0.87   04/10/2024 0.67   04/09/2024 0.84     CREATININE (mg/dL)   Date Value   06/14/2025 0.80     Lab Results   Component Value Date    CHOL 113 06/14/2025    CHOL 138 06/03/2024    CHOL 123 06/05/2023     Lab Results   Component Value Date    HDL 49 06/14/2025    HDL 48.8 06/03/2024    HDL 45.3 06/05/2023     Lab Results   Component Value Date    LDLCALC 51 06/14/2025    LDLCALC 78 06/03/2024     Lab Results   Component Value Date    TRIG 44 06/14/2025    TRIG 58 06/03/2024    TRIG 76 06/05/2023     Lab Results   Component Value Date    TSH 1.87 06/14/2025       CGM INTERPRETATION:  Patient is adhering to and benefitting from continuous glucose monitoring.   Model: DexCom G7  Period reviewed:  14 days  Testing glucose 1440 times daily    Average blood sugar 153 mg/dL, glucose management indicator 7%    Glucose less than 70 mg/dL equals 0% of time worn  Glucose ranging between 70 to 180 mg/dL represented by 75% of time worn  Glucose ranging greater than 180 mg/dL represented by 25% of time worn    >72 hours of data reviewed in order to inform diabetes treatment plan decision making, patient currently at risk for recurrent hypoglycemia safety concerns      IMPRESSION  TYPE 1 DIABETES MELLITUS  INSULIN PUMP STATUS  Excellent glucose control  No significant hypoglycemia      RECOMMENDATIONS  Continue current program    Follow up 6 months  A1c at next appointment         [1]  Family History  Problem Relation Name Age of Onset   • Other (bypass) Father     • Colon cancer Maternal Grandmother     • Colon cancer Paternal Grandmother Cristina         [1]  Family History  Problem Relation Name Age of Onset   • Other (bypass) Father     • Colon cancer Maternal Grandmother     • Colon cancer Paternal Grandmother Cristina

## 2025-07-07 NOTE — PROGRESS NOTES
"History Of Present Illness  Emiliano Walker \"Bryn" is a 59 y.o. male     Duration of type 1 diabetes mellitus:  37 years  Complications:  none     Insulin pump status  Tandem t:Slim with Control-IQ  Insulin:  Novolog     Automated mode:  % (unavailable)  Auto Basal:  Unavailable    Manual basal 24 unit/day  1 unit/h     Bolus  MN-02:30 6.5  2:30-16:30 5  16:30-MN 6.5      Insulin sensitivity factor:  30  Target glucose 110  Active insulin time 5h     Temp target 150 mg/dl during busy work     DexCom G7  Patient is testing glucose 1440 times daily  Records reviewed, on file     Last eye exam:  12/24/24       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), Benign neoplasm, unspecified site (10/10/2017), Disorder of the skin and subcutaneous tissue, unspecified (10/05/2015), Hyperlipidemia, unspecified (06/21/2022), Other conditions influencing health status (10/10/2017), Other hypertrophic disorders of the skin (10/05/2015), Otitis media, unspecified, right ear (03/12/2021), Pain in left foot (10/29/2018), Pain in left leg (07/13/2019), Pain in unspecified wrist (12/26/2020), Personal history of diseases of the skin and subcutaneous tissue (08/26/2013), Personal history of other diseases of the circulatory system (12/29/2015), Personal history of other diseases of the respiratory system (07/13/2019), Personal history of other specified conditions (10/10/2017), Polyp of colon (10/10/2017), Superficial mycosis, unspecified (06/03/2020), Type 1 diabetes mellitus without complications (12/19/2022), Unspecified acute noninfective otitis externa, right ear (04/02/2021), Unspecified injury of right wrist, hand and finger(s), initial encounter (12/28/2020), Unspecified injury of unspecified wrist, hand and finger(s), initial encounter, and Unspecified injury of unspecified wrist, hand and finger(s), initial encounter.    Surgical History  He has a " past surgical history that includes Other surgical history (08/26/2013); Colonoscopy (01/15/2019); Other surgical history (06/21/2022); Hernia repair (April 10, 2024); and Eye surgery.     Social History  He reports that he has never smoked. He has never been exposed to tobacco smoke. He has never used smokeless tobacco. He reports that he does not drink alcohol and does not use drugs.    Family History  Family History[1]    Medications  Current Outpatient Medications   Medication Instructions    ascorbic acid (VITAMIN C) 250 mg, Daily    aspirin 81 mg, Daily    atorvastatin (Lipitor) 20 mg tablet TAKE 1 TABLET BY MOUTH ONE TIME DAILY    cholecalciferol (VITAMIN D3) 50 mcg, Daily    Dexcom G7 Sensor device For continuous glucose monitoring.  Change every 10 days.    insulin lispro (HumaLOG U-100 Insulin) 100 unit/mL injection Continuous subcutaneous insulin pump infusion, 120 units per day.    multivitamin (Daily Multi-Vitamin) tablet 1 tablet, Daily    sildenafil (Viagra) 100 mg tablet TAKE 1 TABLET BY MOUTH ONE TIME DAILY AS NEEDED       Allergies  Patient has no known allergies.    Review of Systems   Constitutional:  Negative for appetite change and fever.   HENT:  Negative for sore throat.         Denies dry mouth   Eyes:  Negative for visual disturbance.   Respiratory:  Negative for cough and shortness of breath.    Cardiovascular:  Negative for chest pain.   Gastrointestinal:  Negative for abdominal pain, constipation, diarrhea, nausea and vomiting.   Endocrine: Negative for polydipsia and polyuria.   Genitourinary:  Negative for frequency.   Musculoskeletal:  Negative for arthralgias.   Skin:  Negative for rash.   Neurological:  Negative for headaches.   Psychiatric/Behavioral:  The patient is not nervous/anxious.          Last Recorded Vitals  Blood pressure 145/72, pulse 66, weight 125 kg (276 lb).    Physical Exam  Constitutional:       General: He is not in acute distress.  HENT:      Head:  Normocephalic.      Mouth/Throat:      Mouth: Mucous membranes are moist.   Eyes:      Extraocular Movements: Extraocular movements intact.   Neck:      Thyroid: No thyroid mass or thyromegaly.   Cardiovascular:      Pulses:           Radial pulses are 2+ on the right side and 2+ on the left side.      Comments: Trace ankle edema bilaterally  Musculoskeletal:      Right lower leg: Edema present.      Left lower leg: Edema present.   Lymphadenopathy:      Cervical: No cervical adenopathy.   Neurological:      Mental Status: He is alert.      Motor: No tremor.   Psychiatric:         Mood and Affect: Affect normal.          Relevant Results  GLUCOSE (mg/dL)   Date Value   06/14/2025 83     Glucose (mg/dL)   Date Value   06/03/2024 137 (H)   04/10/2024 116 (H)   04/09/2024 113 (H)     POC HEMOGLOBIN A1c (%)   Date Value   01/06/2025 6.2   12/18/2023 6.8 (A)     HEMOGLOBIN A1c (%)   Date Value   06/14/2025 6.2 (H)     Hemoglobin A1C (%)   Date Value   06/03/2024 6.2 (H)   06/05/2023 6.0 (A)   08/18/2022 6.4 (A)   02/05/2022 6.5 (A)     CARBON DIOXIDE (mmol/L)   Date Value   06/14/2025 27     Bicarbonate (mmol/L)   Date Value   06/03/2024 27   04/10/2024 24   04/09/2024 27     UREA NITROGEN (BUN) (mg/dL)   Date Value   06/14/2025 13     Urea Nitrogen (mg/dL)   Date Value   06/03/2024 12   04/10/2024 10   04/09/2024 11     Creatinine (mg/dL)   Date Value   06/03/2024 0.87   04/10/2024 0.67   04/09/2024 0.84     CREATININE (mg/dL)   Date Value   06/14/2025 0.80     Lab Results   Component Value Date    CHOL 113 06/14/2025    CHOL 138 06/03/2024    CHOL 123 06/05/2023     Lab Results   Component Value Date    HDL 49 06/14/2025    HDL 48.8 06/03/2024    HDL 45.3 06/05/2023     Lab Results   Component Value Date    LDLCALC 51 06/14/2025    LDLCALC 78 06/03/2024     Lab Results   Component Value Date    TRIG 44 06/14/2025    TRIG 58 06/03/2024    TRIG 76 06/05/2023     Lab Results   Component Value Date    TSH 1.87 06/14/2025        CGM INTERPRETATION:  Patient is adhering to and benefitting from continuous glucose monitoring.   Model: DexCom G7  Period reviewed:  14 days  Testing glucose 1440 times daily    Average blood sugar 153 mg/dL, glucose management indicator 7%    Glucose less than 70 mg/dL equals 0% of time worn  Glucose ranging between 70 to 180 mg/dL represented by 75% of time worn  Glucose ranging greater than 180 mg/dL represented by 25% of time worn    >72 hours of data reviewed in order to inform diabetes treatment plan decision making, patient currently at risk for recurrent hypoglycemia safety concerns      IMPRESSION  TYPE 1 DIABETES MELLITUS  INSULIN PUMP STATUS  Excellent glucose control  No significant hypoglycemia      RECOMMENDATIONS  Continue current program    Follow up 6 months  A1c at next appointment         [1]   Family History  Problem Relation Name Age of Onset    Other (bypass) Father      Colon cancer Maternal Grandmother      Colon cancer Paternal Grandmother Cristina

## 2025-08-01 DIAGNOSIS — N52.9 ERECTILE DYSFUNCTION, UNSPECIFIED ERECTILE DYSFUNCTION TYPE: ICD-10-CM

## 2025-08-01 PROCEDURE — RXMED WILLOW AMBULATORY MEDICATION CHARGE

## 2025-08-01 RX ORDER — SILDENAFIL 100 MG/1
100 TABLET, FILM COATED ORAL DAILY PRN
Qty: 10 TABLET | Refills: 3 | Status: SHIPPED | OUTPATIENT
Start: 2025-08-01 | End: 2026-08-01

## 2025-08-04 ENCOUNTER — PHARMACY VISIT (OUTPATIENT)
Dept: PHARMACY | Facility: CLINIC | Age: 60
End: 2025-08-04
Payer: COMMERCIAL

## 2025-09-03 PROCEDURE — RXMED WILLOW AMBULATORY MEDICATION CHARGE

## 2025-09-04 ENCOUNTER — PHARMACY VISIT (OUTPATIENT)
Dept: PHARMACY | Facility: CLINIC | Age: 60
End: 2025-09-04
Payer: COMMERCIAL

## 2025-10-01 ENCOUNTER — APPOINTMENT (OUTPATIENT)
Dept: PODIATRY | Facility: CLINIC | Age: 60
End: 2025-10-01
Payer: COMMERCIAL

## 2025-11-21 ENCOUNTER — APPOINTMENT (OUTPATIENT)
Dept: DERMATOLOGY | Facility: CLINIC | Age: 60
End: 2025-11-21
Payer: COMMERCIAL

## 2026-01-12 ENCOUNTER — APPOINTMENT (OUTPATIENT)
Dept: ENDOCRINOLOGY | Facility: CLINIC | Age: 61
End: 2026-01-12
Payer: COMMERCIAL

## 2026-07-07 ENCOUNTER — APPOINTMENT (OUTPATIENT)
Dept: PRIMARY CARE | Facility: CLINIC | Age: 61
End: 2026-07-07
Payer: COMMERCIAL

## (undated) DEVICE — Device

## (undated) DEVICE — SUTURE, PDS II, 0 36 IN, CT-1, VIOLET

## (undated) DEVICE — ABDOMINAL BINDER, 3-PANEL, 9, 72 - 84""

## (undated) DEVICE — DRAPE, SHEET, UTILITY, NON ABSORBENT, 18 X 26 IN, LF

## (undated) DEVICE — SPONGE, LAP, XRAY DECT, 18IN X 18IN, W/MASTER DMT, STERILE

## (undated) DEVICE — SUTURE, VICRYL 0, TAPER POINT, CT-1 VIOLET 27 INCH

## (undated) DEVICE — SUTURE, MONOCRYL, 4-0, 27 IN, PS-2, UNDYED

## (undated) DEVICE — SUTURE, VICRYL, 3-0, 27 IN, SH

## (undated) DEVICE — ADHESIVE, SKIN, LIQUIBAND EXCEED

## (undated) DEVICE — BINDER, ABDOMINAL, MEDIUM/LARGE, 12 X 45-62 IN

## (undated) DEVICE — SOLUTION, IRRIGATION, SODIUM CHLORIDE 0.9%, 1000 ML, POUR BOTTLE

## (undated) DEVICE — DRAPE, SHEET, ENDOSCOPY, GENERAL, FENESTRATED, ARMBOARD COVER, 98 X 123.5 IN, DISPOSABLE, LF, STERILE